# Patient Record
Sex: MALE | Race: WHITE | NOT HISPANIC OR LATINO | Employment: OTHER | URBAN - METROPOLITAN AREA
[De-identification: names, ages, dates, MRNs, and addresses within clinical notes are randomized per-mention and may not be internally consistent; named-entity substitution may affect disease eponyms.]

---

## 2017-01-19 ENCOUNTER — HOSPITAL ENCOUNTER (OUTPATIENT)
Dept: CARDIOLOGY | Facility: HOSPITAL | Age: 57
Discharge: HOME | End: 2017-01-19
Admitting: PHYSICIAN ASSISTANT

## 2017-01-19 ENCOUNTER — OFFICE VISIT (OUTPATIENT)
Dept: CARDIOLOGY | Facility: CLINIC | Age: 57
End: 2017-01-19

## 2017-01-19 VITALS
SYSTOLIC BLOOD PRESSURE: 138 MMHG | DIASTOLIC BLOOD PRESSURE: 88 MMHG | HEIGHT: 73 IN | HEART RATE: 67 BPM | BODY MASS INDEX: 28.36 KG/M2 | WEIGHT: 214 LBS

## 2017-01-19 VITALS
WEIGHT: 220 LBS | SYSTOLIC BLOOD PRESSURE: 149 MMHG | DIASTOLIC BLOOD PRESSURE: 102 MMHG | HEIGHT: 73 IN | BODY MASS INDEX: 29.16 KG/M2

## 2017-01-19 DIAGNOSIS — I42.8 NICM (NONISCHEMIC CARDIOMYOPATHY) (HCC): ICD-10-CM

## 2017-01-19 DIAGNOSIS — R06.09 EXERTIONAL DYSPNEA: ICD-10-CM

## 2017-01-19 DIAGNOSIS — I42.8 NONISCHEMIC CARDIOMYOPATHY (HCC): Primary | ICD-10-CM

## 2017-01-19 DIAGNOSIS — R00.2 PALPITATIONS: ICD-10-CM

## 2017-01-19 DIAGNOSIS — I49.3 FREQUENT PVCS: ICD-10-CM

## 2017-01-19 LAB
BH CV ECHO MEAS - AO MAX PG (FULL): 4.1 MMHG
BH CV ECHO MEAS - AO MAX PG: 6.6 MMHG
BH CV ECHO MEAS - AO ROOT AREA (BSA CORRECTED): 1.3
BH CV ECHO MEAS - AO ROOT AREA: 7 CM^2
BH CV ECHO MEAS - AO ROOT DIAM: 3 CM
BH CV ECHO MEAS - AO V2 MAX: 128 CM/SEC
BH CV ECHO MEAS - BSA(HAYCOCK): 2.3 M^2
BH CV ECHO MEAS - BSA: 2.2 M^2
BH CV ECHO MEAS - BZI_BMI: 29 KILOGRAMS/M^2
BH CV ECHO MEAS - BZI_METRIC_HEIGHT: 185.4 CM
BH CV ECHO MEAS - BZI_METRIC_WEIGHT: 99.8 KG
BH CV ECHO MEAS - CONTRAST EF (2CH): 39.5 ML/M^2
BH CV ECHO MEAS - CONTRAST EF 4CH: 32.1 ML/M^2
BH CV ECHO MEAS - EDV(CUBED): 229.8 ML
BH CV ECHO MEAS - EDV(MOD-SP2): 114 ML
BH CV ECHO MEAS - EDV(MOD-SP4): 193 ML
BH CV ECHO MEAS - EDV(TEICH): 188.7 ML
BH CV ECHO MEAS - EF(CUBED): 46.1 %
BH CV ECHO MEAS - EF(MOD-SP2): 39.5 %
BH CV ECHO MEAS - EF(MOD-SP4): 32.1 %
BH CV ECHO MEAS - EF(TEICH): 37.7 %
BH CV ECHO MEAS - ESV(CUBED): 123.9 ML
BH CV ECHO MEAS - ESV(MOD-SP2): 69 ML
BH CV ECHO MEAS - ESV(MOD-SP4): 131 ML
BH CV ECHO MEAS - ESV(TEICH): 117.5 ML
BH CV ECHO MEAS - FS: 18.6 %
BH CV ECHO MEAS - IVS/LVPW: 1
BH CV ECHO MEAS - IVSD: 1.1 CM
BH CV ECHO MEAS - LA DIMENSION: 4.3 CM
BH CV ECHO MEAS - LA/AO: 1.4
BH CV ECHO MEAS - LAT PEAK E' VEL: 10.1 CM/SEC
BH CV ECHO MEAS - LV DIASTOLIC VOL/BSA (35-75): 86.1 ML/M^2
BH CV ECHO MEAS - LV MASS(C)D: 297.3 GRAMS
BH CV ECHO MEAS - LV MASS(C)DI: 132.7 GRAMS/M^2
BH CV ECHO MEAS - LV MAX PG: 2.5 MMHG
BH CV ECHO MEAS - LV MEAN PG: 1.5 MMHG
BH CV ECHO MEAS - LV SYSTOLIC VOL/BSA (12-30): 58.5 ML/M^2
BH CV ECHO MEAS - LV V1 MAX: 78.4 CM/SEC
BH CV ECHO MEAS - LV V1 MEAN: 58 CM/SEC
BH CV ECHO MEAS - LV V1 VTI: 17.4 CM
BH CV ECHO MEAS - LVIDD: 6.1 CM
BH CV ECHO MEAS - LVIDS: 5 CM
BH CV ECHO MEAS - LVLD AP2: 8.6 CM
BH CV ECHO MEAS - LVLD AP4: 9.7 CM
BH CV ECHO MEAS - LVLS AP2: 7.5 CM
BH CV ECHO MEAS - LVLS AP4: 8 CM
BH CV ECHO MEAS - LVPWD: 1.1 CM
BH CV ECHO MEAS - MED PEAK E' VEL: 6.4 CM/SEC
BH CV ECHO MEAS - MV A MAX VEL: 57.4 CM/SEC
BH CV ECHO MEAS - MV E MAX VEL: 61.7 CM/SEC
BH CV ECHO MEAS - MV E/A: 1.1
BH CV ECHO MEAS - PA ACC SLOPE: 290.9 CM/SEC^2
BH CV ECHO MEAS - PA ACC TIME: 0.19 SEC
BH CV ECHO MEAS - PA MAX PG: 4.4 MMHG
BH CV ECHO MEAS - PA PR(ACCEL): -5 MMHG
BH CV ECHO MEAS - PA V2 MAX: 105.3 CM/SEC
BH CV ECHO MEAS - PI END-D VEL: 85.7 CM/SEC
BH CV ECHO MEAS - RVDD: 3 CM
BH CV ECHO MEAS - SI(CUBED): 47.2 ML/M^2
BH CV ECHO MEAS - SI(MOD-SP2): 20.1 ML/M^2
BH CV ECHO MEAS - SI(MOD-SP4): 27.7 ML/M^2
BH CV ECHO MEAS - SI(TEICH): 31.8 ML/M^2
BH CV ECHO MEAS - SV(CUBED): 105.8 ML
BH CV ECHO MEAS - SV(MOD-SP2): 45 ML
BH CV ECHO MEAS - SV(MOD-SP4): 62 ML
BH CV ECHO MEAS - SV(TEICH): 71.2 ML
BH CV ECHO MEAS - TAPSE (>1.6): 2.6 CM2
BH CV XLRA - RV BASE: 2.9 CM
BH CV XLRA - RV LENGTH: 8.2 CM
BH CV XLRA - RV MID: 2.5 CM
BH CV XLRA - TDI S': 13.2 CM/SEC
E/E' RATIO: 7.5
LEFT ATRIUM VOLUME INDEX: 44 ML/M2
LEFT ATRIUM VOLUME: 100 CM3
LV EF 2D ECHO EST: 48 %

## 2017-01-19 PROCEDURE — C8929 TTE W OR WO FOL WCON,DOPPLER: HCPCS

## 2017-01-19 PROCEDURE — 25010000002 SULFUR HEXAFLUORIDE MICROSPH 60.7-25 MG RECONSTITUTED SUSPENSION: Performed by: PHYSICIAN ASSISTANT

## 2017-01-19 PROCEDURE — 99213 OFFICE O/P EST LOW 20 MIN: CPT | Performed by: PHYSICIAN ASSISTANT

## 2017-01-19 PROCEDURE — 93306 TTE W/DOPPLER COMPLETE: CPT | Performed by: INTERNAL MEDICINE

## 2017-01-19 RX ORDER — CARVEDILOL 6.25 MG/1
TABLET ORAL 2 TIMES DAILY
Refills: 1 | COMMUNITY
Start: 2016-12-22 | End: 2017-01-19 | Stop reason: SDUPTHER

## 2017-01-19 RX ORDER — CARVEDILOL 6.25 MG/1
6.25 TABLET ORAL 2 TIMES DAILY
Qty: 60 TABLET | Refills: 6 | Status: SHIPPED | OUTPATIENT
Start: 2017-01-19 | End: 2017-03-19 | Stop reason: SDUPTHER

## 2017-01-19 RX ORDER — RANITIDINE 300 MG/1
300 TABLET ORAL 2 TIMES DAILY
Refills: 0 | COMMUNITY
Start: 2017-01-06 | End: 2020-08-17

## 2017-01-19 RX ORDER — LISINOPRIL 20 MG/1
20 TABLET ORAL DAILY
Refills: 1 | COMMUNITY
Start: 2016-12-22 | End: 2017-01-19 | Stop reason: SDUPTHER

## 2017-01-19 RX ORDER — LISINOPRIL 20 MG/1
20 TABLET ORAL DAILY
Qty: 30 TABLET | Refills: 6 | Status: SHIPPED | OUTPATIENT
Start: 2017-01-19 | End: 2017-03-19 | Stop reason: SDUPTHER

## 2017-01-19 RX ADMIN — SULFUR HEXAFLUORIDE 2 ML: KIT at 11:06

## 2017-01-19 NOTE — MR AVS SNAPSHOT
David Fajardo   1/19/2017 11:15 AM   Office Visit    Dept Phone:  [phone]   Encounter #:  [id number]    Provider:  KVNG Mishra   Department:  [facility] CARDIOLOGY                Your Full Care Plan              Your Updated Medication List          This list is accurate as of: 1/19/17 12:08 PM.  Always use your most recent med list.                carvedilol 6.25 MG tablet   Commonly known as:  COREG       lisinopril 20 MG tablet   Commonly known as:  PRINIVIL,ZESTRIL       raNITIdine 300 MG tablet   Commonly known as:  ZANTAC               You Were Diagnosed With        Codes Comments    Nonischemic cardiomyopathy    -  Primary ICD-10-CM: I42.9  ICD-9-CM: 425.4     Palpitations     ICD-10-CM: R00.2  ICD-9-CM: 785.1     Exertional dyspnea     ICD-10-CM: R06.09  ICD-9-CM: 786.09       Instructions     None    Patient Instructions History      Upcoming Appointments     Visit Type Date Time Department    FOLLOW UP 1/19/2017 11:15 AM [facility]     [facility] ECHO 2D COMPLETE VT 1/19/2017  9:30 AM [facility] NONINVASIVE LAB    FOLLOW UP 3/16/2017 10:30 AM [facility]      MyChart Signup     Our records indicate that you have declined [employer] signup. If you would like to sign up for basnohart, please email We [email or web address] or call [phone] to obtain an activation code.             Other Info from Your Visit           Your Appointments     Mar 16, 2017 10:30 AM EDT   Follow Up with KVNG Mishra   [facility] CARDIOLOGY (--)    [address] [place] [address] [address] [place] [id number] [phone]           Arrive 15 minutes prior to appointment.              Allergies     No Known Allergies      Reason for Visit     Follow-up Cardiomyopathy    Dizziness     Shortness of Breath     Palpitations           Vital Signs     Blood Pressure Pulse Height Weight Body Mass Index Smoking Status    "   138/88 (BP Location: Right arm, Patient Position: Sitting) 67 73\" (185.4 cm) 214 lb (97.1 kg) 28.23 kg/m2 Former Smoker      Problems and Diagnoses Noted     Exertional dyspnea    Nonischemic cardiomyopathy    Palpitations        "

## 2017-01-19 NOTE — PROGRESS NOTES
"     [facility] at [facility] - Office Note  David Fajardo         [address] [address]  1960   [phone] (home)      LOCATION:  [place] office.  Visit Type: Follow Up.    PCP:  Viki Mon MD    01/19/17   David Fajardo is a 56 y.o.  male  retired.      Chief Complaint:  Follow-up, c/o Dizziness, Shortness of Breath and Palpitations.  PROBLEM LIST:  1. Nonischemic cardiomyopathy:  a. Abnormal Holter monitor with associated dizziness and nausea.   b. PVCs and nonsustained VT by Holter monitor.  c. Cardiac catheterization, 10/22/2013: Normal coronaries but EF approximately 15% to 20%.  d. Echocardiographic ejection fraction, January 2014, 45% to 50%.  e. Echocardiogram, 05/04/2015, EF of 45%.  f. Echo 1/19/17:  EF 48%, mild MR/SD.  2. Family history of premature cardiomyopathy.  3. History of bleeding ulcers with resolution.  4. Tobacco abuse, quitting 32 years ago.  5. Surgical history:  a. Right tear duct opening in 1998.      No Known Allergies      Current Outpatient Prescriptions:   •  carvedilol (COREG) 6.25 MG tablet, 2 (Two) Times a Day., Disp: , Rfl: 1  •  lisinopril (PRINIVIL,ZESTRIL) 20 MG tablet, Take 20 mg by mouth Daily., Disp: , Rfl: 1  •  raNITIdine (ZANTAC) 300 MG tablet, Take 300 mg by mouth Daily., Disp: , Rfl: 0  No current facility-administered medications for this visit.     HPI    Mr. Pimentel is here for routine follow-up on his nonischemic cardiopathy myopathy.  Since her last visit with us, he's done fairly well.  Lately, he's complained of generalized fatigue, dyspnea, occasional fluttering of his heart.  He also mentions abdominal bloating that had a normal EGD and GI workup.  He is \"on call\" several times throughout a weeks period, which requires him to get up in the middle the night for 6-8 hours on a job site.  He believes a lot of his symptoms are resulting from his fatigue and lifestyle.  He denies chest pain, denies lower " "extremity edema, denies syncopal events.  The following portions of the patient's history were reviewed in the chart and updated as appropriate: allergies, current medications, past family history, past medical history, past social history, past surgical history and problem list.    Review of Systems   Constitution: Positive for decreased appetite and malaise/fatigue.   Cardiovascular: Positive for dyspnea on exertion and palpitations.   Gastrointestinal: Positive for bloating.   All other systems reviewed and are negative.            height is 73\" (185.4 cm) and weight is 214 lb (97.1 kg). His blood pressure is 138/88 and his pulse is 67.   Physical Exam   Constitutional: Vital signs are normal. He appears well-developed and well-nourished.   Cardiovascular: Normal rate, regular rhythm, S1 normal, S2 normal, normal heart sounds, intact distal pulses and normal pulses.    Pulmonary/Chest: Effort normal and breath sounds normal. He has no wheezes. He has no rhonchi. He has no rales.   Abdominal: Soft. Normal appearance and bowel sounds are normal. There is no hepatosplenomegaly.   Neurological: He is alert.         Procedures     Assessment/ Plan     Nonischemic cardiomyopathy:  Stable and well compensated.  Continue medications, provide refills.  I reviewed today's echo with the patient and family.  RTC in march.  If he is feeling better at that time with rest and no work - he can reschedule out to June.    Palpitations:  Stable.    Exertional dyspnea:  Stable.  Most likely attributable to fatigue.  Continue medications.  Echo wnl/stable.  RTC as directed.    Fatimah Calero PA-C    "

## 2017-02-09 ENCOUNTER — TELEPHONE (OUTPATIENT)
Dept: CARDIOLOGY | Facility: CLINIC | Age: 57
End: 2017-02-09

## 2017-03-20 RX ORDER — CARVEDILOL 6.25 MG/1
TABLET ORAL
Qty: 60 TABLET | Refills: 11 | Status: SHIPPED | OUTPATIENT
Start: 2017-03-20 | End: 2018-08-09 | Stop reason: SDUPTHER

## 2017-03-20 RX ORDER — LISINOPRIL 20 MG/1
TABLET ORAL
Qty: 30 TABLET | Refills: 11 | Status: SHIPPED | OUTPATIENT
Start: 2017-03-20 | End: 2018-08-09 | Stop reason: SDUPTHER

## 2017-04-20 ENCOUNTER — OFFICE VISIT (OUTPATIENT)
Dept: CARDIOLOGY | Facility: CLINIC | Age: 57
End: 2017-04-20

## 2017-04-20 VITALS
BODY MASS INDEX: 27.43 KG/M2 | SYSTOLIC BLOOD PRESSURE: 124 MMHG | DIASTOLIC BLOOD PRESSURE: 80 MMHG | HEART RATE: 56 BPM | WEIGHT: 207 LBS | HEIGHT: 73 IN

## 2017-04-20 DIAGNOSIS — R00.2 PALPITATIONS: ICD-10-CM

## 2017-04-20 DIAGNOSIS — R06.09 EXERTIONAL DYSPNEA: ICD-10-CM

## 2017-04-20 DIAGNOSIS — I42.8 NONISCHEMIC CARDIOMYOPATHY (HCC): Primary | ICD-10-CM

## 2017-04-20 PROCEDURE — 99213 OFFICE O/P EST LOW 20 MIN: CPT | Performed by: PHYSICIAN ASSISTANT

## 2017-04-20 RX ORDER — SERTRALINE HYDROCHLORIDE 25 MG/1
25 TABLET, FILM COATED ORAL DAILY
COMMUNITY
End: 2018-08-09

## 2017-04-20 NOTE — PROGRESS NOTES
[facility] at [facility] - Office Note  David Fajardo         [address] [address]  1960   [phone] (home)      LOCATION:  [place] office.  Visit Type: Follow Up.    PCP:  Viki Mon MD    04/20/17   David Fajardo is a 57 y.o.  male  currently employed.      Chief Complaint: Fatigue  PROBLEM LIST:  1. Nonischemic cardiomyopathy:  a. Abnormal Holter monitor with associated dizziness and nausea.   b. PVCs and nonsustained VT by Holter monitor.  c. Cardiac catheterization, 10/22/2013: Normal coronaries but EF approximately 15% to 20%.  d. Echocardiographic ejection fraction, January 2014, 45% to 50%.  e. Echocardiogram, 05/04/2015, EF of 45%.  f. Echo 1/19/17: EF 48%, mild MR/DE.  2. Family history of premature cardiomyopathy.  3. History of bleeding ulcers with resolution.  4. Tobacco abuse, quitting 32 years ago.  5. Surgical history:  a. Right tear duct opening in 1998.         No Known Allergies      Current Outpatient Prescriptions:   •  carvedilol (COREG) 6.25 MG tablet, take 1 tablet by mouth twice a day, Disp: 60 tablet, Rfl: 11  •  lisinopril (PRINIVIL,ZESTRIL) 20 MG tablet, take 1 tablet by mouth once daily, Disp: 30 tablet, Rfl: 11  •  raNITIdine (ZANTAC) 300 MG tablet, Take 300 mg by mouth Daily., Disp: , Rfl: 0  •  sertraline (ZOLOFT) 25 MG tablet, Take 25 mg by mouth Daily., Disp: , Rfl:     HPI    Mr. Fajardo is here for follow-up on his nonischemic cardiomyopathy.  At his last visit, he was complaining of 2 specific complaints; abdominal bloating and fatigue with dyspnea.  Since that visit, he is further along in the process of retiring and is no longer on call at night.  He believes this is helped with his fatigue and dyspnea.  He does still wake up concerned about all of these matters, and has subsequently been put on an antidepressant by his PCP.  As far as his abdominal bloating is concerned, his Zantac has been changed from a capsule to  "a tablet.  He states that for whatever reason, this is helped tremendously.  He has no further GI issues today.  He did file for disability after his last appointment.  He was denied and is currently going through the peel process.  The following portions of the patient's history were reviewed in the chart and updated as appropriate: allergies, current medications, past family history, past medical history, past social history, past surgical history and problem list.    Review of Systems   Constitution: Positive for weakness. Negative for weight gain.   Cardiovascular: Negative for chest pain and leg swelling.   Respiratory: Negative for shortness of breath.    Gastrointestinal: Negative for bloating and change in bowel habit.   Psychiatric/Behavioral: The patient is nervous/anxious.    All other systems reviewed and are negative.            height is 73\" (185.4 cm) and weight is 207 lb (93.9 kg). His blood pressure is 124/80 and his pulse is 56.   Physical Exam   Constitutional: Vital signs are normal. He appears well-developed and well-nourished.   Cardiovascular: Normal rate, regular rhythm, S1 normal, S2 normal, normal heart sounds, intact distal pulses and normal pulses.    Pulmonary/Chest: Effort normal and breath sounds normal. He has no wheezes. He has no rhonchi. He has no rales.   Abdominal: Soft. Normal appearance and bowel sounds are normal. There is no hepatosplenomegaly.   Neurological: He is alert.         Procedures     Assessment/ Plan   Nonischemic cardiomyopathy:  Stable and well compensated.  Again, I wanted to follow-up with him sooner rather than later to make sure that his symptoms were job-related.  At this time he appears to be doing better and has no further complaints.  Continue current medical regimen which includes beta blocker, ACE inhibitor.  Return to clinic 6 months or sooner when necessary.      Palpitations:  No further recurrence.  Continue on beta blocker.      Exertional " dyspnea:  Stable without progression.  Continue activity as tolerated particularly with [facility].        Fatimah Calero PA-C  4/20/2017 9:11 AM      EMR Dragon/Transcription disclaimer:   Much of this encounter note is an electronic transcription/translation of spoken language to printed text. The electronic translation of spoken language may permit erroneous, or at times, nonsensical words or phrases to be inadvertently transcribed; Although I have reviewed the note for such errors, some may still exist.

## 2017-04-24 ENCOUNTER — LAB REQUISITION (OUTPATIENT)
Dept: LAB | Facility: HOSPITAL | Age: 57
End: 2017-04-24

## 2017-04-24 DIAGNOSIS — A04.8 OTHER SPECIFIED BACTERIAL INTESTINAL INFECTIONS: ICD-10-CM

## 2017-04-24 PROCEDURE — 87338 HPYLORI STOOL AG IA: CPT | Performed by: INTERNAL MEDICINE

## 2017-04-27 LAB — H PYLORI AG STL QL IA: NEGATIVE

## 2017-10-30 ENCOUNTER — OFFICE VISIT (OUTPATIENT)
Dept: CARDIOLOGY | Facility: CLINIC | Age: 57
End: 2017-10-30

## 2017-10-30 VITALS
BODY MASS INDEX: 27.7 KG/M2 | OXYGEN SATURATION: 99 % | SYSTOLIC BLOOD PRESSURE: 122 MMHG | HEART RATE: 64 BPM | HEIGHT: 73 IN | DIASTOLIC BLOOD PRESSURE: 84 MMHG | WEIGHT: 209 LBS

## 2017-10-30 DIAGNOSIS — I42.8 NONISCHEMIC CARDIOMYOPATHY (HCC): Primary | ICD-10-CM

## 2017-10-30 DIAGNOSIS — I49.3 PVC'S (PREMATURE VENTRICULAR CONTRACTIONS): ICD-10-CM

## 2017-10-30 PROCEDURE — 99214 OFFICE O/P EST MOD 30 MIN: CPT | Performed by: INTERNAL MEDICINE

## 2017-10-30 PROCEDURE — 93000 ELECTROCARDIOGRAM COMPLETE: CPT | Performed by: INTERNAL MEDICINE

## 2017-10-30 RX ORDER — ATORVASTATIN CALCIUM 10 MG/1
10 TABLET, FILM COATED ORAL DAILY
COMMUNITY
End: 2018-08-09 | Stop reason: SDUPTHER

## 2017-10-30 NOTE — PROGRESS NOTES
[facility] at [facility]  Office Progress Note  David Fajardo  1960  [phone]      Visit Date: 10/30/2017    PCP: Viki Mon MD  [address] [place] [address]    IDENTIFICATION: A 57 y.o. male, , retired, from [place], [place], daughter is [employer] pt Jenise Burgos     Chief Complaint   Patient presents with   • NICM       PROBLEM LIST:   1. Nonischemic cardiomyopathy:  a. Abnormal Holter monitor with associated dizziness and nausea.   b. PVCs and nonsustained VT by Holter monitor.  c. Cardiac catheterization, 10/22/2013: Normal coronaries but EF approximately 15% to 20%.  d. Echocardiographic ejection fraction, January 2014, 45% to 50%.  e. Echocardiogram, 05/04/2015, EF of 45%.  f. Echo 1/19/17: EF 48%, mild MR/DC.  2. Family history of premature cardiomyopathy.  3. History of bleeding ulcers with resolution.  4. Tobacco abuse, quitting 32 years ago.  5. Surgical history:  a. Right tear duct opening in 1998.        Allergies  No Known Allergies    Current Medications    Current Outpatient Prescriptions:   •  atorvastatin (LIPITOR) 10 MG tablet, Take 10 mg by mouth Daily., Disp: , Rfl:   •  carvedilol (COREG) 6.25 MG tablet, take 1 tablet by mouth twice a day, Disp: 60 tablet, Rfl: 11  •  lisinopril (PRINIVIL,ZESTRIL) 20 MG tablet, take 1 tablet by mouth once daily, Disp: 30 tablet, Rfl: 11  •  raNITIdine (ZANTAC) 300 MG tablet, Take 300 mg by mouth 2 (Two) Times a Day., Disp: , Rfl: 0  •  sertraline (ZOLOFT) 25 MG tablet, Take 25 mg by mouth Daily., Disp: , Rfl:       History of Present Illness   Pt here for follow up. Had previously been seeing Dr. Newell. Stable dyspnea. Is now retired and enjoying doing hobbies around the house. Pt denies any chest pain, dyspnea at rest, orthopnea, PND, palpitations, lower extremity edema, or claudication.    ROS:  All systems have been reviewed and are negative with the exception of those mentioned in the  "HPI.    OBJECTIVE:  Vitals:    10/30/17 1033   BP: 122/84   BP Location: Right arm   Patient Position: Sitting   Pulse: 64   SpO2: 99%   Weight: 209 lb (94.8 kg)   Height: 73\" (185.4 cm)     Physical Exam   Constitutional: He is oriented to person, place, and time. He appears well-developed and well-nourished. No distress.   Cardiovascular: Normal rate, regular rhythm, normal heart sounds and intact distal pulses.    No murmur heard.  Pulses:       Radial pulses are 2+ on the right side, and 2+ on the left side.        Dorsalis pedis pulses are 2+ on the right side, and 2+ on the left side.        Posterior tibial pulses are 2+ on the right side, and 2+ on the left side.   Pulmonary/Chest: Effort normal and breath sounds normal. He has no wheezes. He has no rales.   Abdominal: Soft. Bowel sounds are normal. There is no tenderness. There is no guarding.   Musculoskeletal: He exhibits no edema or tenderness.   Neurological: He is alert and oriented to person, place, and time.   Skin: Skin is warm and dry. No rash noted.   Psychiatric: He has a normal mood and affect.   Nursing note and vitals reviewed.      Diagnostic Data:    ECG 12 Lead  Date/Time: 10/30/2017 10:43 AM  Performed by: SCOTT MEJIA  Authorized by: SCOTT MEJIA   Rhythm: sinus rhythm  Ectopy: infrequent PVCs  BPM: 60  Clinical impression: abnormal ECG          ASSESSMENT:   Diagnosis Plan   1. Nonischemic cardiomyopathy     2. PVC's (premature ventricular contractions)  ECG 12 Lead       PLAN:  1. Stable symptoms with echo in January showing EF 48%. Will plan on echo every 1-2 years. Pt on lisinopril and coreg at present, will hold off on entresto/spironolactone at this time due to BP being optimal and doing well clinically. Would consider in the future with changes of symptoms.   2. Asymptomatic    Viki Mon MD, thank you for referring Mr. Fajardo for evaluation.  I have forwarded my electronically generated recommendations to you for " review.  Please do not hesitate to call with any questions.    Scribed for Ki Forbes MD by Ene Martinez PA-C. 10/30/2017  11:01 AM  I, Ki Forbes MD, personally performed the services described in this documentation as scribed by the above named individual in my presence, and it is both accurate and complete.  10/30/2017  12:40 PM    Ki Forbes MD, FACC

## 2018-04-13 ENCOUNTER — TELEPHONE (OUTPATIENT)
Dept: CARDIOLOGY | Facility: CLINIC | Age: 58
End: 2018-04-13

## 2018-04-13 NOTE — TELEPHONE ENCOUNTER
Called and let daughter know patient handicap paperwork is ready for pickup. Will have in [place] on Monday.

## 2018-08-09 ENCOUNTER — OFFICE VISIT (OUTPATIENT)
Dept: CARDIOLOGY | Facility: CLINIC | Age: 58
End: 2018-08-09

## 2018-08-09 VITALS
HEIGHT: 73 IN | DIASTOLIC BLOOD PRESSURE: 76 MMHG | WEIGHT: 210.6 LBS | BODY MASS INDEX: 27.91 KG/M2 | SYSTOLIC BLOOD PRESSURE: 132 MMHG | HEART RATE: 54 BPM

## 2018-08-09 DIAGNOSIS — I50.22 CHRONIC SYSTOLIC CONGESTIVE HEART FAILURE (HCC): ICD-10-CM

## 2018-08-09 DIAGNOSIS — R00.2 PALPITATIONS: Primary | ICD-10-CM

## 2018-08-09 PROCEDURE — 99213 OFFICE O/P EST LOW 20 MIN: CPT | Performed by: INTERNAL MEDICINE

## 2018-08-09 RX ORDER — LISINOPRIL 20 MG/1
20 TABLET ORAL DAILY
Qty: 30 TABLET | Refills: 11 | Status: SHIPPED | OUTPATIENT
Start: 2018-08-09

## 2018-08-09 RX ORDER — ATORVASTATIN CALCIUM 10 MG/1
10 TABLET, FILM COATED ORAL DAILY
Qty: 30 TABLET | Refills: 11 | Status: SHIPPED | OUTPATIENT
Start: 2018-08-09 | End: 2019-08-12 | Stop reason: SDUPTHER

## 2018-08-09 RX ORDER — CARVEDILOL 6.25 MG/1
6.25 TABLET ORAL 2 TIMES DAILY
Qty: 60 TABLET | Refills: 11 | Status: SHIPPED | OUTPATIENT
Start: 2018-08-09

## 2018-08-09 NOTE — PROGRESS NOTES
[facility] at [facility]  Office Progress Note  David Fajardo  1960  [phone]      Visit Date: 8/9/2018      PCP: Viki Mon MD  [address] [place] [address]    IDENTIFICATION: A 58 y.o. male, , retired, from [place], [place], daughter is [employer] pt Jenise Burgos     Chief Complaint   Patient presents with   • Nonischemic cardiomyopathy       PROBLEM LIST:   1. Nonischemic cardiomyopathy:  a. Abnormal Holter monitor with associated dizziness and nausea.   b. PVCs and nonsustained VT by Holter monitor.  c. Cardiac catheterization, 10/22/2013: Normal coronaries but EF approximately 15% to 20%.  d. Echocardiographic ejection fraction, January 2014, 45% to 50%.  e. Echocardiogram, 05/04/2015, EF of 45%.  f. Echo 1/19/17: EF 48%, mild MR/MD.  2. Family history of premature cardiomyopathy.  3. History of bleeding ulcers with resolution.  4. Tobacco abuse, quitting 32 years ago.  5. Surgical history:  a. Right tear duct opening in 1998.        Allergies  No Known Allergies    Current Medications    Current Outpatient Prescriptions:   •  atorvastatin (LIPITOR) 10 MG tablet, Take 10 mg by mouth Daily., Disp: , Rfl:   •  carvedilol (COREG) 6.25 MG tablet, take 1 tablet by mouth twice a day, Disp: 60 tablet, Rfl: 11  •  lisinopril (PRINIVIL,ZESTRIL) 20 MG tablet, take 1 tablet by mouth once daily, Disp: 30 tablet, Rfl: 11  •  raNITIdine (ZANTAC) 300 MG tablet, Take 300 mg by mouth 2 (Two) Times a Day., Disp: , Rfl: 0      History of Present Illness   Pt here for follow up. Had remotely followed w  Dr. Newell. Stable dyspnea. Pt denies any chest pain, dyspnea at rest, orthopnea, PND, palpitations, lower extremity edema, or claudication.  Takes cruises regularly and works out of doors.    ROS:  All systems have been reviewed and are negative with the exception of those mentioned in the HPI.    OBJECTIVE:  Vitals:    08/09/18 0856   BP: 132/76   BP Location: Right arm  "  Patient Position: Sitting   Pulse: 54   Weight: 95.5 kg (210 lb 9.6 oz)   Height: 185.4 cm (73\")     Physical Exam   Constitutional: He is oriented to person, place, and time. He appears well-developed and well-nourished. No distress.   Cardiovascular: Normal rate, regular rhythm, normal heart sounds and intact distal pulses.    No murmur heard.  Pulses:       Radial pulses are 2+ on the right side, and 2+ on the left side.        Dorsalis pedis pulses are 2+ on the right side, and 2+ on the left side.        Posterior tibial pulses are 2+ on the right side, and 2+ on the left side.   Pulmonary/Chest: Effort normal and breath sounds normal. He has no wheezes. He has no rales.   Abdominal: Soft. Bowel sounds are normal. There is no tenderness. There is no guarding.   Musculoskeletal: He exhibits no edema or tenderness.   Neurological: He is alert and oriented to person, place, and time.   Skin: Skin is warm and dry. No rash noted.   Psychiatric: He has a normal mood and affect.   Nursing note and vitals reviewed.      Diagnostic Data:  Procedures  ASSESSMENT:   Diagnosis Plan   1. Palpitations     2. Chronic systolic congestive heart failure (CMS/HCC)         PLAN:   1.  Systolic chf - to fu echo this next year.   2.  Palpitations stable w control   3.   Htn controlled.     8/9/2018  9:03 AM    Ki Forbes MD, FACC  "

## 2019-01-11 DIAGNOSIS — I42.9 CARDIOMYOPATHY, UNSPECIFIED TYPE (HCC): Primary | ICD-10-CM

## 2019-07-03 ENCOUNTER — TELEPHONE (OUTPATIENT)
Dept: CARDIOLOGY | Facility: CLINIC | Age: 59
End: 2019-07-03

## 2019-07-03 ENCOUNTER — LAB (OUTPATIENT)
Dept: LAB | Facility: HOSPITAL | Age: 59
End: 2019-07-03

## 2019-07-03 ENCOUNTER — TRANSCRIBE ORDERS (OUTPATIENT)
Dept: LAB | Facility: HOSPITAL | Age: 59
End: 2019-07-03

## 2019-07-03 DIAGNOSIS — A77.40 EHRLICHIOSIS, UNSPECIFIED: ICD-10-CM

## 2019-07-03 DIAGNOSIS — N17.0 ACUTE KIDNEY FAILURE WITH LESION OF TUBULAR NECROSIS (HCC): ICD-10-CM

## 2019-07-03 DIAGNOSIS — R91.8 LUNG MASS: ICD-10-CM

## 2019-07-03 DIAGNOSIS — R65.21 SEPTIC SHOCK DUE TO TRANSFUSION, INITIAL ENCOUNTER (HCC): ICD-10-CM

## 2019-07-03 DIAGNOSIS — A77.40 EHRLICHIOSIS, UNSPECIFIED: Primary | ICD-10-CM

## 2019-07-03 DIAGNOSIS — R74.02 NONSPECIFIC ELEVATION OF LEVELS OF TRANSAMINASE OR LACTIC ACID DEHYDROGENASE (LDH): ICD-10-CM

## 2019-07-03 DIAGNOSIS — A41.9 SEPTIC SHOCK DUE TO TRANSFUSION, INITIAL ENCOUNTER (HCC): ICD-10-CM

## 2019-07-03 DIAGNOSIS — J98.11 DISCOID ATELECTASIS: ICD-10-CM

## 2019-07-03 DIAGNOSIS — T80.29XA SEPTIC SHOCK DUE TO TRANSFUSION, INITIAL ENCOUNTER (HCC): ICD-10-CM

## 2019-07-03 DIAGNOSIS — D69.59 THROMBOCYTOPENIA DUE TO DIMINISHED PLATELET PRODUCTION: ICD-10-CM

## 2019-07-03 DIAGNOSIS — R74.01 NONSPECIFIC ELEVATION OF LEVELS OF TRANSAMINASE OR LACTIC ACID DEHYDROGENASE (LDH): ICD-10-CM

## 2019-07-03 LAB
ALBUMIN SERPL-MCNC: 3.6 G/DL (ref 3.5–5.2)
ALBUMIN/GLOB SERPL: 1.3 G/DL
ALP SERPL-CCNC: 88 U/L (ref 39–117)
ALT SERPL W P-5'-P-CCNC: 110 U/L (ref 1–41)
ANION GAP SERPL CALCULATED.3IONS-SCNC: 11 MMOL/L (ref 5–15)
AST SERPL-CCNC: 61 U/L (ref 1–40)
BASOPHILS # BLD MANUAL: 0.08 10*3/MM3 (ref 0–0.2)
BASOPHILS NFR BLD AUTO: 1 % (ref 0–1.5)
BILIRUB SERPL-MCNC: 0.8 MG/DL (ref 0.2–1.2)
BUN BLD-MCNC: 16 MG/DL (ref 8–23)
BUN/CREAT SERPL: 16.2 (ref 7–25)
CALCIUM SPEC-SCNC: 9.1 MG/DL (ref 8.6–10.5)
CHLORIDE SERPL-SCNC: 102 MMOL/L (ref 98–107)
CO2 SERPL-SCNC: 27 MMOL/L (ref 22–29)
CREAT BLD-MCNC: 0.99 MG/DL (ref 0.76–1.27)
CRP SERPL-MCNC: 1.15 MG/DL (ref 0–0.5)
DEPRECATED RDW RBC AUTO: 44.7 FL (ref 37–54)
EOSINOPHIL # BLD MANUAL: 0.16 10*3/MM3 (ref 0–0.4)
EOSINOPHIL NFR BLD MANUAL: 2 % (ref 0.3–6.2)
ERYTHROCYTE [DISTWIDTH] IN BLOOD BY AUTOMATED COUNT: 13.8 % (ref 12.3–15.4)
ERYTHROCYTE [SEDIMENTATION RATE] IN BLOOD: 9 MM/HR (ref 0–20)
GFR SERPL CREATININE-BSD FRML MDRD: 77 ML/MIN/1.73
GLOBULIN UR ELPH-MCNC: 2.8 GM/DL
GLUCOSE BLD-MCNC: 90 MG/DL (ref 65–99)
HCT VFR BLD AUTO: 41.7 % (ref 37.5–51)
HGB BLD-MCNC: 13.3 G/DL (ref 13–17.7)
LYMPHOCYTES # BLD MANUAL: 2.12 10*3/MM3 (ref 0.7–3.1)
LYMPHOCYTES NFR BLD MANUAL: 14 % (ref 5–12)
LYMPHOCYTES NFR BLD MANUAL: 27 % (ref 19.6–45.3)
MCH RBC QN AUTO: 28.3 PG (ref 26.6–33)
MCHC RBC AUTO-ENTMCNC: 31.9 G/DL (ref 31.5–35.7)
MCV RBC AUTO: 88.7 FL (ref 79–97)
MONOCYTES # BLD AUTO: 1.1 10*3/MM3 (ref 0.1–0.9)
NEUTROPHILS # BLD AUTO: 2.75 10*3/MM3 (ref 1.7–7)
NEUTROPHILS NFR BLD MANUAL: 35 % (ref 42.7–76)
PLAT MORPH BLD: NORMAL
PLATELET # BLD AUTO: 356 10*3/MM3 (ref 140–450)
PMV BLD AUTO: 9.7 FL (ref 6–12)
POTASSIUM BLD-SCNC: 5 MMOL/L (ref 3.5–5.2)
PROT SERPL-MCNC: 6.4 G/DL (ref 6–8.5)
RBC # BLD AUTO: 4.7 10*6/MM3 (ref 4.14–5.8)
RBC MORPH BLD: NORMAL
SODIUM BLD-SCNC: 140 MMOL/L (ref 136–145)
VARIANT LYMPHS NFR BLD MANUAL: 21 % (ref 0–5)
WBC MORPH BLD: NORMAL
WBC NRBC COR # BLD: 7.86 10*3/MM3 (ref 3.4–10.8)

## 2019-07-03 PROCEDURE — 85007 BL SMEAR W/DIFF WBC COUNT: CPT

## 2019-07-03 PROCEDURE — 85652 RBC SED RATE AUTOMATED: CPT

## 2019-07-03 PROCEDURE — 36415 COLL VENOUS BLD VENIPUNCTURE: CPT

## 2019-07-03 PROCEDURE — 86140 C-REACTIVE PROTEIN: CPT

## 2019-07-03 PROCEDURE — 80053 COMPREHEN METABOLIC PANEL: CPT

## 2019-07-03 PROCEDURE — 85025 COMPLETE CBC W/AUTO DIFF WBC: CPT

## 2019-07-08 ENCOUNTER — RESULTS ENCOUNTER (OUTPATIENT)
Dept: LAB | Facility: HOSPITAL | Age: 59
End: 2019-07-08

## 2019-07-08 DIAGNOSIS — N17.0 ACUTE KIDNEY FAILURE WITH LESION OF TUBULAR NECROSIS (HCC): ICD-10-CM

## 2019-07-08 DIAGNOSIS — R74.01 NONSPECIFIC ELEVATION OF LEVELS OF TRANSAMINASE OR LACTIC ACID DEHYDROGENASE (LDH): ICD-10-CM

## 2019-07-08 DIAGNOSIS — T80.29XA SEPTIC SHOCK DUE TO TRANSFUSION, INITIAL ENCOUNTER (HCC): ICD-10-CM

## 2019-07-08 DIAGNOSIS — J98.11 DISCOID ATELECTASIS: ICD-10-CM

## 2019-07-08 DIAGNOSIS — R91.8 LUNG MASS: ICD-10-CM

## 2019-07-08 DIAGNOSIS — D69.59 THROMBOCYTOPENIA DUE TO DIMINISHED PLATELET PRODUCTION: ICD-10-CM

## 2019-07-08 DIAGNOSIS — A41.9 SEPTIC SHOCK DUE TO TRANSFUSION, INITIAL ENCOUNTER (HCC): ICD-10-CM

## 2019-07-08 DIAGNOSIS — R74.02 NONSPECIFIC ELEVATION OF LEVELS OF TRANSAMINASE OR LACTIC ACID DEHYDROGENASE (LDH): ICD-10-CM

## 2019-07-08 DIAGNOSIS — A77.40 EHRLICHIOSIS, UNSPECIFIED: ICD-10-CM

## 2019-07-08 DIAGNOSIS — R65.21 SEPTIC SHOCK DUE TO TRANSFUSION, INITIAL ENCOUNTER (HCC): ICD-10-CM

## 2019-07-15 DIAGNOSIS — I42.8 NONISCHEMIC CARDIOMYOPATHY (HCC): ICD-10-CM

## 2019-07-15 DIAGNOSIS — N17.9 ACUTE RENAL FAILURE, UNSPECIFIED ACUTE RENAL FAILURE TYPE (HCC): Primary | ICD-10-CM

## 2019-07-18 ENCOUNTER — LAB (OUTPATIENT)
Dept: LAB | Facility: HOSPITAL | Age: 59
End: 2019-07-18

## 2019-07-18 ENCOUNTER — TRANSCRIBE ORDERS (OUTPATIENT)
Dept: LAB | Facility: HOSPITAL | Age: 59
End: 2019-07-18

## 2019-07-18 DIAGNOSIS — R91.8 LUNG MASS: ICD-10-CM

## 2019-07-18 DIAGNOSIS — A41.9 SEPTIC SHOCK DUE TO TRANSFUSION, INITIAL ENCOUNTER (HCC): ICD-10-CM

## 2019-07-18 DIAGNOSIS — R65.21 SEPTIC SHOCK DUE TO TRANSFUSION, INITIAL ENCOUNTER (HCC): ICD-10-CM

## 2019-07-18 DIAGNOSIS — T80.29XA SEPTIC SHOCK DUE TO TRANSFUSION, INITIAL ENCOUNTER (HCC): ICD-10-CM

## 2019-07-18 DIAGNOSIS — J98.11 DISCOID ATELECTASIS: ICD-10-CM

## 2019-07-18 DIAGNOSIS — A77.40 EHRLICHIOSIS, UNSPECIFIED: ICD-10-CM

## 2019-07-18 DIAGNOSIS — A77.40 EHRLICHIOSIS, UNSPECIFIED: Primary | ICD-10-CM

## 2019-07-18 LAB
ALBUMIN SERPL-MCNC: 4.2 G/DL (ref 3.5–5.2)
ALBUMIN/GLOB SERPL: 1.4 G/DL
ALP SERPL-CCNC: 68 U/L (ref 39–117)
ALT SERPL W P-5'-P-CCNC: 50 U/L (ref 1–41)
ANION GAP SERPL CALCULATED.3IONS-SCNC: 12 MMOL/L (ref 5–15)
AST SERPL-CCNC: 29 U/L (ref 1–40)
BILIRUB SERPL-MCNC: 0.7 MG/DL (ref 0.2–1.2)
BUN BLD-MCNC: 16 MG/DL (ref 8–23)
BUN/CREAT SERPL: 16.7 (ref 7–25)
CALCIUM SPEC-SCNC: 9.3 MG/DL (ref 8.6–10.5)
CHLORIDE SERPL-SCNC: 102 MMOL/L (ref 98–107)
CO2 SERPL-SCNC: 27 MMOL/L (ref 22–29)
CREAT BLD-MCNC: 0.96 MG/DL (ref 0.76–1.27)
GFR SERPL CREATININE-BSD FRML MDRD: 80 ML/MIN/1.73
GLOBULIN UR ELPH-MCNC: 2.9 GM/DL
GLUCOSE BLD-MCNC: 89 MG/DL (ref 65–99)
POTASSIUM BLD-SCNC: 4.6 MMOL/L (ref 3.5–5.2)
PROT SERPL-MCNC: 7.1 G/DL (ref 6–8.5)
SODIUM BLD-SCNC: 141 MMOL/L (ref 136–145)

## 2019-07-18 PROCEDURE — 80053 COMPREHEN METABOLIC PANEL: CPT

## 2019-07-18 PROCEDURE — 36415 COLL VENOUS BLD VENIPUNCTURE: CPT

## 2019-08-09 ENCOUNTER — TELEPHONE (OUTPATIENT)
Dept: CARDIOLOGY | Facility: CLINIC | Age: 59
End: 2019-08-09

## 2019-08-12 ENCOUNTER — TELEPHONE (OUTPATIENT)
Dept: CARDIOLOGY | Facility: CLINIC | Age: 59
End: 2019-08-12

## 2019-08-12 ENCOUNTER — HOSPITAL ENCOUNTER (OUTPATIENT)
Dept: CARDIOLOGY | Facility: HOSPITAL | Age: 59
Discharge: HOME | End: 2019-08-12
Attending: INTERNAL MEDICINE

## 2019-08-12 ENCOUNTER — OFFICE VISIT (OUTPATIENT)
Dept: CARDIOLOGY | Facility: CLINIC | Age: 59
End: 2019-08-12

## 2019-08-12 VITALS
HEART RATE: 62 BPM | BODY MASS INDEX: 27.83 KG/M2 | HEIGHT: 73 IN | WEIGHT: 210 LBS | SYSTOLIC BLOOD PRESSURE: 126 MMHG | DIASTOLIC BLOOD PRESSURE: 84 MMHG | OXYGEN SATURATION: 98 %

## 2019-08-12 DIAGNOSIS — E78.2 MIXED HYPERLIPIDEMIA: Primary | ICD-10-CM

## 2019-08-12 DIAGNOSIS — I42.0 DILATED CARDIOMYOPATHY (HCC): Primary | ICD-10-CM

## 2019-08-12 DIAGNOSIS — E78.2 MIXED HYPERLIPIDEMIA: ICD-10-CM

## 2019-08-12 DIAGNOSIS — I10 ESSENTIAL HYPERTENSION: ICD-10-CM

## 2019-08-12 DIAGNOSIS — I42.9 CARDIOMYOPATHY, UNSPECIFIED TYPE (HCC): ICD-10-CM

## 2019-08-12 PROCEDURE — 99213 OFFICE O/P EST LOW 20 MIN: CPT | Performed by: INTERNAL MEDICINE

## 2019-08-12 RX ORDER — CYCLOBENZAPRINE HCL 10 MG
10 TABLET ORAL 3 TIMES DAILY PRN
Refills: 0 | COMMUNITY
Start: 2019-07-29 | End: 2021-12-13

## 2019-08-12 RX ORDER — ONDANSETRON 4 MG/1
4 TABLET, FILM COATED ORAL EVERY 6 HOURS PRN
Refills: 0 | COMMUNITY
Start: 2019-07-18

## 2019-08-12 RX ORDER — ATORVASTATIN CALCIUM 10 MG/1
TABLET, FILM COATED ORAL
Qty: 30 TABLET | Refills: 0 | Status: SHIPPED | OUTPATIENT
Start: 2019-08-12

## 2019-08-12 NOTE — PROGRESS NOTES
[facility] at [facility]  Office Progress Note  David Fajardo  1960  [phone]      Visit Date: 8/12/2019      PCP: Viki Mon MD  [address] [place] [address]    IDENTIFICATION: A 59 y.o. male, , retired, from [place], [place], daughter is [employer] pt Angela Burgos     Chief Complaint   Patient presents with   • PVC's (premature ventricular contractions)   • Nonischemic cardiomyopathy       PROBLEM LIST:   1. Nonischemic cardiomyopathy:  a. Abnormal Holter monitor with associated dizziness and nausea.   b. PVCs and nonsustained VT by Holter monitor.  c. Cardiac catheterization, 10/22/2013: Normal coronaries but EF approximately 15% to 20%.  d. Echocardiographic ejection fraction, January 2014, 45% to 50%.  e. Echocardiogram, 05/04/2015, EF of 45%.  f. Echo 1/19/17: EF 48%, mild MR/LA.  g. 8/12/2019 echo:  2. Family history of premature cardiomyopathy.  3. History of bleeding ulcers with resolution.  4. Tobacco abuse, quitting 32 years ago.  5. Ehrlichiosis 2019 shock w ARF, resolved post ICU SJE  6. Surgical history:  a. Right tear duct opening in 1998.        Allergies  No Known Allergies    Current Medications    Current Outpatient Medications:   •  atorvastatin (LIPITOR) 10 MG tablet, Take 1 tablet by mouth Daily., Disp: 30 tablet, Rfl: 11  •  carvedilol (COREG) 6.25 MG tablet, Take 1 tablet by mouth 2 (Two) Times a Day., Disp: 60 tablet, Rfl: 11  •  cyclobenzaprine (FLEXERIL) 10 MG tablet, Take 10 mg by mouth 3 (Three) Times a Day As Needed., Disp: , Rfl: 0  •  lisinopril (PRINIVIL,ZESTRIL) 20 MG tablet, Take 1 tablet by mouth Daily. (Patient taking differently: Take 20 mg by mouth As Needed.), Disp: 30 tablet, Rfl: 11  •  ondansetron (ZOFRAN) 4 MG tablet, Take 4 mg by mouth Every 6 (Six) Hours As Needed., Disp: , Rfl: 0  •  raNITIdine (ZANTAC) 300 MG tablet, Take 300 mg by mouth 2 (Two) Times a Day., Disp: , Rfl: 0      History of Present Illness   Pt here  "for follow up.  Had a difficult Mimi as he had returned from a cruise to [place] and 2 days in [place] returned home with flulike symptoms myalgias fever anorexia and subsequently was admitted to [facility] and diagnosed with early ketosis.  Patient was in acute renal failure and subsequently treated with doxycycline with improvement in his symptoms.  He has had low normal blood pressure in the interim and has been holding his lisinopril.  He did state that he had an echocardiogram while hospitalized at [facility] and ejection fraction was quoted as 40%.      OBJECTIVE:  Vitals:    08/12/19 0853   BP: 126/84   BP Location: Right arm   Patient Position: Sitting   Pulse: 62   SpO2: 98%   Weight: 95.3 kg (210 lb)   Height: 185.4 cm (73\")     Physical Exam   Constitutional: He is oriented to person, place, and time. He appears well-developed and well-nourished. No distress.   Cardiovascular: Normal rate, regular rhythm, normal heart sounds and intact distal pulses.   No murmur heard.  Pulses:       Radial pulses are 2+ on the right side, and 2+ on the left side.        Dorsalis pedis pulses are 2+ on the right side, and 2+ on the left side.        Posterior tibial pulses are 2+ on the right side, and 2+ on the left side.   Pulmonary/Chest: Effort normal and breath sounds normal. He has no wheezes. He has no rales.   Abdominal: Soft. Bowel sounds are normal. There is no tenderness. There is no guarding.   Musculoskeletal: He exhibits no edema or tenderness.   Neurological: He is alert and oriented to person, place, and time.   Skin: Skin is warm and dry. No rash noted.   Psychiatric: He has a normal mood and affect.   Nursing note and vitals reviewed.      Diagnostic Data:  Procedures  ASSESSMENT:   Diagnosis Plan   1. Dilated cardiomyopathy (CMS/HCC)     2. Essential hypertension     3. Mixed hyperlipidemia         PLAN:  Echo in 60 days to follow chronic CM    Restart nightly lisinopril to avoid " hypotension    Hl on statin      Ki Forbes MD, FACC

## 2019-08-12 NOTE — TELEPHONE ENCOUNTER
Pt seen today and needed refills on his Lipitor but hasn't had a recent lipid panel. I gave him 30 days and put in a lipid panel to have completed.

## 2019-10-11 DIAGNOSIS — I42.8 NONISCHEMIC CARDIOMYOPATHY (HCC): Primary | ICD-10-CM

## 2019-10-11 DIAGNOSIS — R00.2 PALPITATIONS: ICD-10-CM

## 2019-10-14 ENCOUNTER — HOSPITAL ENCOUNTER (OUTPATIENT)
Dept: CARDIOLOGY | Facility: HOSPITAL | Age: 59
Discharge: HOME | End: 2019-10-14
Admitting: INTERNAL MEDICINE

## 2019-10-14 ENCOUNTER — APPOINTMENT (OUTPATIENT)
Dept: CARDIOLOGY | Facility: HOSPITAL | Age: 59
End: 2019-10-14
Attending: INTERNAL MEDICINE

## 2019-10-14 VITALS — BODY MASS INDEX: 27.83 KG/M2 | WEIGHT: 210 LBS | HEIGHT: 73 IN

## 2019-10-14 DIAGNOSIS — I42.8 NONISCHEMIC CARDIOMYOPATHY (HCC): ICD-10-CM

## 2019-10-14 DIAGNOSIS — R00.2 PALPITATIONS: ICD-10-CM

## 2019-10-14 LAB
BH CV ECHO MEAS - AO MAX PG (FULL): 3.1 MMHG
BH CV ECHO MEAS - AO MAX PG: 6 MMHG
BH CV ECHO MEAS - AO MEAN PG (FULL): 3 MMHG
BH CV ECHO MEAS - AO MEAN PG: 4 MMHG
BH CV ECHO MEAS - AO ROOT AREA (BSA CORRECTED): 1.4
BH CV ECHO MEAS - AO ROOT AREA: 7.5 CM^2
BH CV ECHO MEAS - AO ROOT DIAM: 3.1 CM
BH CV ECHO MEAS - AO V2 MAX: 124 CM/SEC
BH CV ECHO MEAS - AO V2 MEAN: 89.3 CM/SEC
BH CV ECHO MEAS - AO V2 VTI: 26.7 CM
BH CV ECHO MEAS - AVA(I,A): 2.6 CM^2
BH CV ECHO MEAS - AVA(I,D): 2.6 CM^2
BH CV ECHO MEAS - AVA(V,A): 2.6 CM^2
BH CV ECHO MEAS - AVA(V,D): 2.6 CM^2
BH CV ECHO MEAS - BSA(HAYCOCK): 2.2 M^2
BH CV ECHO MEAS - BSA: 2.2 M^2
BH CV ECHO MEAS - BZI_BMI: 27.7 KILOGRAMS/M^2
BH CV ECHO MEAS - BZI_METRIC_HEIGHT: 185.4 CM
BH CV ECHO MEAS - BZI_METRIC_WEIGHT: 95.3 KG
BH CV ECHO MEAS - EDV(CUBED): 232.6 ML
BH CV ECHO MEAS - EDV(MOD-SP2): 201 ML
BH CV ECHO MEAS - EDV(MOD-SP4): 246 ML
BH CV ECHO MEAS - EDV(TEICH): 190.4 ML
BH CV ECHO MEAS - EF(CUBED): 26.1 %
BH CV ECHO MEAS - EF(MOD-BP): 30 %
BH CV ECHO MEAS - EF(MOD-SP2): 35.8 %
BH CV ECHO MEAS - EF(MOD-SP4): 34.6 %
BH CV ECHO MEAS - EF(TEICH): 20.6 %
BH CV ECHO MEAS - ESV(CUBED): 171.9 ML
BH CV ECHO MEAS - ESV(MOD-SP2): 129 ML
BH CV ECHO MEAS - ESV(MOD-SP4): 161 ML
BH CV ECHO MEAS - ESV(TEICH): 151.2 ML
BH CV ECHO MEAS - FS: 9.6 %
BH CV ECHO MEAS - IVS/LVPW: 1
BH CV ECHO MEAS - IVSD: 1.1 CM
BH CV ECHO MEAS - LA DIMENSION: 4.6 CM
BH CV ECHO MEAS - LA/AO: 1.5
BH CV ECHO MEAS - LAD MAJOR: 4.8 CM
BH CV ECHO MEAS - LAT PEAK E' VEL: 5.4 CM/SEC
BH CV ECHO MEAS - LATERAL E/E' RATIO: 13.6
BH CV ECHO MEAS - LV DIASTOLIC VOL/BSA (35-75): 112 ML/M^2
BH CV ECHO MEAS - LV MASS(C)D: 303.7 GRAMS
BH CV ECHO MEAS - LV MASS(C)DI: 138.3 GRAMS/M^2
BH CV ECHO MEAS - LV MAX PG: 2.9 MMHG
BH CV ECHO MEAS - LV MEAN PG: 1 MMHG
BH CV ECHO MEAS - LV SYSTOLIC VOL/BSA (12-30): 73.3 ML/M^2
BH CV ECHO MEAS - LV V1 MAX: 85.7 CM/SEC
BH CV ECHO MEAS - LV V1 MEAN: 56.6 CM/SEC
BH CV ECHO MEAS - LV V1 VTI: 18.3 CM
BH CV ECHO MEAS - LVIDD: 6.2 CM
BH CV ECHO MEAS - LVIDS: 5.6 CM
BH CV ECHO MEAS - LVLD AP2: 9.2 CM
BH CV ECHO MEAS - LVLD AP4: 8.7 CM
BH CV ECHO MEAS - LVLS AP2: 7.8 CM
BH CV ECHO MEAS - LVLS AP4: 8 CM
BH CV ECHO MEAS - LVOT AREA (M): 3.8 CM^2
BH CV ECHO MEAS - LVOT AREA: 3.8 CM^2
BH CV ECHO MEAS - LVOT DIAM: 2.2 CM
BH CV ECHO MEAS - LVPWD: 1.1 CM
BH CV ECHO MEAS - MED PEAK E' VEL: 4.6 CM/SEC
BH CV ECHO MEAS - MEDIAL E/E' RATIO: 15.8
BH CV ECHO MEAS - MV A MAX VEL: 54.3 CM/SEC
BH CV ECHO MEAS - MV DEC TIME: 0.22 SEC
BH CV ECHO MEAS - MV E MAX VEL: 73.1 CM/SEC
BH CV ECHO MEAS - MV E/A: 1.3
BH CV ECHO MEAS - PA ACC SLOPE: 762 CM/SEC^2
BH CV ECHO MEAS - PA ACC TIME: 0.11 SEC
BH CV ECHO MEAS - PA MAX PG: 4 MMHG
BH CV ECHO MEAS - PA PR(ACCEL): 31.3 MMHG
BH CV ECHO MEAS - PA V2 MAX: 99.9 CM/SEC
BH CV ECHO MEAS - SI(AO): 91.7 ML/M^2
BH CV ECHO MEAS - SI(CUBED): 27.6 ML/M^2
BH CV ECHO MEAS - SI(LVOT): 31.7 ML/M^2
BH CV ECHO MEAS - SI(MOD-SP2): 32.8 ML/M^2
BH CV ECHO MEAS - SI(MOD-SP4): 38.7 ML/M^2
BH CV ECHO MEAS - SI(TEICH): 17.9 ML/M^2
BH CV ECHO MEAS - SV(AO): 201.5 ML
BH CV ECHO MEAS - SV(CUBED): 60.7 ML
BH CV ECHO MEAS - SV(LVOT): 69.6 ML
BH CV ECHO MEAS - SV(MOD-SP2): 72 ML
BH CV ECHO MEAS - SV(MOD-SP4): 85 ML
BH CV ECHO MEAS - SV(TEICH): 39.3 ML
BH CV ECHO MEAS - TAPSE (>1.6): 2.6 CM2
BH CV ECHO MEASUREMENTS AVERAGE E/E' RATIO: 14.62
BH CV XLRA - RV BASE: 4.3 CM
BH CV XLRA - RV LENGTH: 7.5 CM
BH CV XLRA - RV MID: 3.4 CM
BH CV XLRA - TDI S': 11.8 CM/SEC
LEFT ATRIUM VOLUME INDEX: 31 ML/M^2
LEFT ATRIUM VOLUME: 68 ML
LV EF 2D ECHO EST: 36 %

## 2019-10-14 PROCEDURE — 93306 TTE W/DOPPLER COMPLETE: CPT | Performed by: INTERNAL MEDICINE

## 2019-10-14 PROCEDURE — 93306 TTE W/DOPPLER COMPLETE: CPT

## 2020-02-24 ENCOUNTER — TELEPHONE (OUTPATIENT)
Dept: CARDIOLOGY | Facility: CLINIC | Age: 60
End: 2020-02-24

## 2020-02-24 NOTE — TELEPHONE ENCOUNTER
Patient wife called with concerns from PCP of patient heart rate running at 42 bpm today. Patient is not symptomatic or having any problems. Advised wife to cut morning dose of Coreg in half from 6.25 to 3.125 mg and then take a whole pill at night. Pt wife verbalized understanding and will call back with any issues.

## 2020-08-17 ENCOUNTER — OFFICE VISIT (OUTPATIENT)
Dept: CARDIOLOGY | Facility: CLINIC | Age: 60
End: 2020-08-17

## 2020-08-17 VITALS
HEART RATE: 66 BPM | DIASTOLIC BLOOD PRESSURE: 76 MMHG | HEIGHT: 73 IN | WEIGHT: 218 LBS | OXYGEN SATURATION: 97 % | SYSTOLIC BLOOD PRESSURE: 112 MMHG | BODY MASS INDEX: 28.89 KG/M2

## 2020-08-17 DIAGNOSIS — I10 ESSENTIAL HYPERTENSION: ICD-10-CM

## 2020-08-17 DIAGNOSIS — B33.24 VIRAL CARDIOMYOPATHY (HCC): Primary | ICD-10-CM

## 2020-08-17 DIAGNOSIS — E78.2 MIXED HYPERLIPIDEMIA: ICD-10-CM

## 2020-08-17 PROCEDURE — 93000 ELECTROCARDIOGRAM COMPLETE: CPT | Performed by: INTERNAL MEDICINE

## 2020-08-17 PROCEDURE — 99214 OFFICE O/P EST MOD 30 MIN: CPT | Performed by: INTERNAL MEDICINE

## 2020-08-17 NOTE — PROGRESS NOTES
[facility] at [facility]  Office Progress Note  David Fajardo  1960  [phone]      Visit Date: 8/17/2020      PCP: Viki Mon MD  [address] [place] [address]    IDENTIFICATION: A 60 y.o. male, , retired, from [place], [place], daughter is [facility] pt Angela Burgos     Chief Complaint   Patient presents with   • Cardiomyopathy       PROBLEM LIST:   1. Nonischemic cardiomyopathy:  a. Abnormal Holter monitor with associated dizziness and nausea.   b. PVCs and nonsustained VT by Holter monitor.  c. Cardiac catheterization, 10/22/2013: Normal coronaries but EF approximately 15% to 20%.  d. Echocardiographic ejection fraction, January 2014, 45% to 50%.  e. Echocardiogram, 05/04/2015, EF of 45%.  f. Echo 1/19/17: EF 48%, mild MR/ID.  g. 10/12/2019 echo: EF 36% mod MR  2. Family history of premature cardiomyopathy.  3. History of bleeding ulcers with resolution.  4. Tobacco abuse, quitting 32 years ago.  5. Ehrlichiosis 2019(post [employer] cruise) shock w ARF, resolved post ICU SJE  6. Surgical history:  a. Right tear duct opening in 1998.        Allergies  No Known Allergies    Current Medications    Current Outpatient Medications:   •  atorvastatin (LIPITOR) 10 MG tablet, take 1 tablet by mouth once daily, Disp: 30 tablet, Rfl: 0  •  carvedilol (COREG) 6.25 MG tablet, Take 1 tablet by mouth 2 (Two) Times a Day., Disp: 60 tablet, Rfl: 11  •  cyclobenzaprine (FLEXERIL) 10 MG tablet, Take 10 mg by mouth 3 (Three) Times a Day As Needed., Disp: , Rfl: 0  •  lisinopril (PRINIVIL,ZESTRIL) 20 MG tablet, Take 1 tablet by mouth Daily. (Patient taking differently: Take 20 mg by mouth As Needed.), Disp: 30 tablet, Rfl: 11  •  ondansetron (ZOFRAN) 4 MG tablet, Take 4 mg by mouth Every 6 (Six) Hours As Needed., Disp: , Rfl: 0      History of Present Illness   Pt here for follow up.  He is been compliant with his medication does activities daily living without no crescendo pattern  "of dyspnea.  He attempts to maintain lower carbohydrate intake.    OBJECTIVE:  Vitals:    08/17/20 0927   BP: 112/76   BP Location: Right arm   Patient Position: Sitting   Pulse: 66   SpO2: 97%   Weight: 98.9 kg (218 lb)   Height: 185.4 cm (73\")     Physical Exam   Constitutional: He is oriented to person, place, and time. He appears well-developed and well-nourished. No distress.   Cardiovascular: Normal rate, regular rhythm, normal heart sounds and intact distal pulses.   No murmur heard.  Pulses:       Radial pulses are 2+ on the right side, and 2+ on the left side.        Dorsalis pedis pulses are 2+ on the right side, and 2+ on the left side.        Posterior tibial pulses are 2+ on the right side, and 2+ on the left side.   Pulmonary/Chest: Effort normal and breath sounds normal. He has no wheezes. He has no rales.   Abdominal: Soft. Bowel sounds are normal. There is no tenderness. There is no guarding.   Musculoskeletal: He exhibits no edema or tenderness.   Neurological: He is alert and oriented to person, place, and time.   Skin: Skin is warm and dry. No rash noted.   Psychiatric: He has a normal mood and affect.   Nursing note and vitals reviewed.      Diagnostic Data:    ECG 12 Lead  Date/Time: 8/17/2020 10:41 AM  Performed by: Ki Forbes MD  Authorized by: Ki Forbes MD   Comparison: compared with previous ECG from 10/30/2017  Rhythm: sinus rhythm  Ectopy: unifocal PVCs  BPM: 53    Clinical impression: abnormal EKG          ASSESSMENT:   Diagnosis Plan   1. Viral cardiomyopathy (CMS/HCC)     2. Essential hypertension     3. Mixed hyperlipidemia         PLAN:  Cardiomyopathy-familial New York Heart Association class II CHF at current follow-up echocardiogram this following year    Coreg and lisinopril  dosed to avoid hypotension    Hl on statin      Ki Forbes MD, FACC  "

## 2020-08-18 RX ORDER — FAMOTIDINE 40 MG/1
40 TABLET, FILM COATED ORAL 2 TIMES DAILY
COMMUNITY

## 2021-05-17 ENCOUNTER — OFFICE VISIT (OUTPATIENT)
Dept: CARDIOLOGY | Facility: CLINIC | Age: 61
End: 2021-05-17

## 2021-05-17 VITALS
BODY MASS INDEX: 28.63 KG/M2 | HEART RATE: 60 BPM | OXYGEN SATURATION: 98 % | HEIGHT: 73 IN | WEIGHT: 216 LBS | SYSTOLIC BLOOD PRESSURE: 126 MMHG | DIASTOLIC BLOOD PRESSURE: 82 MMHG

## 2021-05-17 DIAGNOSIS — I10 ESSENTIAL HYPERTENSION: ICD-10-CM

## 2021-05-17 DIAGNOSIS — I50.22 CHRONIC SYSTOLIC CONGESTIVE HEART FAILURE (HCC): Primary | ICD-10-CM

## 2021-05-17 DIAGNOSIS — E78.2 MIXED HYPERLIPIDEMIA: ICD-10-CM

## 2021-05-17 PROCEDURE — 99214 OFFICE O/P EST MOD 30 MIN: CPT | Performed by: INTERNAL MEDICINE

## 2021-05-17 RX ORDER — HYDROCODONE BITARTRATE AND ACETAMINOPHEN 5; 325 MG/1; MG/1
1 TABLET ORAL AS NEEDED
COMMUNITY

## 2021-05-17 NOTE — PROGRESS NOTES
[facility] at [facility]  Office Progress Note  David Fajardo  1960  [phone]      Visit Date: 5/17/2021      PCP: Viki Mon MD  [address] [place] [address]    IDENTIFICATION: A 61 y.o. male, , retired, from [place], [place], daughter is [facility] pt Angela Burgos     Chief Complaint   Patient presents with   • Cardiomyopathy       PROBLEM LIST:   1. Nonischemic cardiomyopathy:  a. Abnormal Holter monitor with associated dizziness and nausea.   b. PVCs and nonsustained VT by Holter monitor.  c. Cardiac catheterization, 10/22/2013: Normal coronaries but EF approximately 15% to 20%.  d. Echocardiographic ejection fraction, January 2014, 45% to 50%.  e. Echocardiogram, 05/04/2015, EF of 45%.  f. Echo 1/19/17: EF 48%, mild MR/IA.  g. 10/12/2019 echo: EF 36% mod MR  2. Family history of premature cardiomyopathy.  3. History of bleeding ulcers with resolution.  4. Tobacco abuse, quitting 32 years ago.  5. Ehrlichiosis 2019(post [employer] cruise) shock w ARF, resolved post ICU SJE  6. Surgical history:  a. Right tear duct opening in 1998.        Allergies  No Known Allergies    Current Medications    Current Outpatient Medications:   •  atorvastatin (LIPITOR) 10 MG tablet, take 1 tablet by mouth once daily, Disp: 30 tablet, Rfl: 0  •  carvedilol (COREG) 6.25 MG tablet, Take 1 tablet by mouth 2 (Two) Times a Day., Disp: 60 tablet, Rfl: 11  •  cyclobenzaprine (FLEXERIL) 10 MG tablet, Take 10 mg by mouth 3 (Three) Times a Day As Needed., Disp: , Rfl: 0  •  famotidine (PEPCID) 40 MG tablet, Take 40 mg by mouth 2 (two) times a day., Disp: , Rfl:   •  HYDROcodone-acetaminophen (NORCO) 5-325 MG per tablet, Take 1 tablet by mouth As Needed., Disp: , Rfl:   •  lisinopril (PRINIVIL,ZESTRIL) 20 MG tablet, Take 1 tablet by mouth Daily., Disp: 30 tablet, Rfl: 11  •  ondansetron (ZOFRAN) 4 MG tablet, Take 4 mg by mouth Every 6 (Six) Hours As Needed., Disp: , Rfl: 0      History of  "Present Illness   Pt here for follow up.  Has had his Covid vaccines and states he is anxious about wanting to go on a cruise lines again!  States he is out-of-doors working in his yard regularly without any provocative symptoms  On occasion he states that his pulse rate will measure in the 40s but he is a symptom      OBJECTIVE:  Vitals:    05/17/21 1009   BP: 126/82   BP Location: Right arm   Patient Position: Sitting   Pulse: 60   SpO2: 98%   Weight: 98 kg (216 lb)   Height: 185.4 cm (73\")     Physical Exam  Vitals and nursing note reviewed.   Constitutional:       General: He is not in acute distress.     Appearance: He is well-developed.   Cardiovascular:      Rate and Rhythm: Normal rate and regular rhythm.      Pulses: Intact distal pulses.           Radial pulses are 2+ on the right side and 2+ on the left side.        Dorsalis pedis pulses are 2+ on the right side and 2+ on the left side.        Posterior tibial pulses are 2+ on the right side and 2+ on the left side.      Heart sounds: Normal heart sounds. No murmur heard.     Pulmonary:      Effort: Pulmonary effort is normal.      Breath sounds: Normal breath sounds. No wheezing or rales.   Abdominal:      General: Bowel sounds are normal.      Palpations: Abdomen is soft.      Tenderness: There is no abdominal tenderness. There is no guarding.   Musculoskeletal:         General: No tenderness.   Skin:     General: Skin is warm and dry.      Findings: No rash.   Neurological:      Mental Status: He is alert and oriented to person, place, and time.         Diagnostic Data:  Procedures  ASSESSMENT:   Diagnosis Plan   1. Chronic systolic congestive heart failure (CMS/HCC)     2. Essential hypertension     3. Mixed hyperlipidemia         PLAN:  Cardiomyopathy-familial New York Heart Association class II CHF at current follow-up echocardiogram this following visit    Coreg and lisinopril  dosed to avoid hypotension    Hl on statin      Ki Forbes MD, " FACC

## 2021-05-26 NOTE — TELEPHONE ENCOUNTER
Prior Authorization started in cover my meds    Please sign-on to Cover My Meds and complete the Prior authorization.    Message sent to the Prior authorization pool - Please don't close this message.        Prior authorization is required for medication - Prior authorization started online at Cover My Meds.    Medication requested to be refilled:   exenatide (BYETTA) 5 MCG/0.02ML pen-injector  Qty:  1.2 ml    ======================================================================    Generic therapeutic alternatives for Byetta 5 MCG Pen are available    metFORMIN HCl  metFORMIN HCl ER  glipiZIDE  glipiZIDE ER  Glimepiride  glipiZIDE-metFORMIN HCl  Pioglitazone HCl         Pt called in and he just got discharged from [facility]. Have records to be scanned in. Pt will have lab work in 2 weeks to check on his kidney function and to check his bp for one week. Pt is to call back to let us know how his blood pressure has been doing. Once his blood pressure and kidney function allow he can be restarted on his coreg and lisinopril.

## 2021-12-13 ENCOUNTER — OFFICE VISIT (OUTPATIENT)
Dept: CARDIOLOGY | Facility: CLINIC | Age: 61
End: 2021-12-13

## 2021-12-13 ENCOUNTER — HOSPITAL ENCOUNTER (OUTPATIENT)
Dept: CARDIOLOGY | Facility: HOSPITAL | Age: 61
Discharge: HOME | End: 2021-12-13
Admitting: INTERNAL MEDICINE

## 2021-12-13 VITALS
OXYGEN SATURATION: 96 % | WEIGHT: 220 LBS | DIASTOLIC BLOOD PRESSURE: 68 MMHG | BODY MASS INDEX: 29.16 KG/M2 | HEART RATE: 60 BPM | HEIGHT: 73 IN | SYSTOLIC BLOOD PRESSURE: 138 MMHG

## 2021-12-13 DIAGNOSIS — I50.22 CHRONIC SYSTOLIC CONGESTIVE HEART FAILURE (HCC): Primary | ICD-10-CM

## 2021-12-13 DIAGNOSIS — I10 PRIMARY HYPERTENSION: ICD-10-CM

## 2021-12-13 DIAGNOSIS — I50.22 CHRONIC SYSTOLIC CONGESTIVE HEART FAILURE (HCC): ICD-10-CM

## 2021-12-13 PROBLEM — I25.10 ATHEROSCLEROSIS OF CORONARY ARTERY: Status: ACTIVE | Noted: 2021-12-13

## 2021-12-13 PROBLEM — E78.5 DYSLIPIDEMIA: Status: ACTIVE | Noted: 2017-10-11

## 2021-12-13 PROBLEM — F41.9 ANXIETY: Status: ACTIVE | Noted: 2017-10-11

## 2021-12-13 PROCEDURE — 99213 OFFICE O/P EST LOW 20 MIN: CPT | Performed by: INTERNAL MEDICINE

## 2021-12-13 PROCEDURE — 93306 TTE W/DOPPLER COMPLETE: CPT

## 2021-12-13 PROCEDURE — 25010000002 SULFUR HEXAFLUORIDE MICROSPH 60.7-25 MG RECONSTITUTED SUSPENSION: Performed by: INTERNAL MEDICINE

## 2021-12-13 PROCEDURE — 93306 TTE W/DOPPLER COMPLETE: CPT | Performed by: INTERNAL MEDICINE

## 2021-12-13 RX ADMIN — SULFUR HEXAFLUORIDE 3 ML: KIT at 15:35

## 2021-12-13 NOTE — PROGRESS NOTES
[facility] at [facility]  Office Progress Note  David Fajardo  1960  [phone]      Visit Date: 12/13/2021      PCP: Viki Mon MD  [address] [place] [address]    IDENTIFICATION: A 61 y.o. male, , retired, from [place], [place], daughter is [facility] pt Angela Loretta     Chief Complaint   Patient presents with   • Cardiomyopathy       PROBLEM LIST:   1. Nonischemic cardiomyopathy:  a. Abnormal Holter monitor with associated dizziness and nausea.   b. PVCs and nonsustained VT by Holter monitor.  c. Cardiac catheterization, 10/22/2013: Normal coronaries but EF approximately 15% to 20%.  d. Echocardiographic ejection fraction, January 2014, 45% to 50%.  e. Echocardiogram, 05/04/2015, EF of 45%.  f. Echo 1/19/17: EF 48%, mild MR/MD.  g. 12/21 echo: EF 36% mod MR  2. Family history of premature cardiomyopathy.  3. History of bleeding ulcers with resolution.  4. Tobacco abuse, quitting 32 years ago.  5. Ehrlichiosis 2019(post [employer] cruise) shock w ARF, resolved post ICU SJE  6. Surgical history:  a. Right tear duct opening in 1998.        Allergies  No Known Allergies    Current Medications    Current Outpatient Medications:   •  atorvastatin (LIPITOR) 10 MG tablet, take 1 tablet by mouth once daily, Disp: 30 tablet, Rfl: 0  •  carvedilol (COREG) 6.25 MG tablet, Take 1 tablet by mouth 2 (Two) Times a Day., Disp: 60 tablet, Rfl: 11  •  famotidine (PEPCID) 40 MG tablet, Take 40 mg by mouth 2 (two) times a day., Disp: , Rfl:   •  HYDROcodone-acetaminophen (NORCO) 5-325 MG per tablet, Take 1 tablet by mouth As Needed., Disp: , Rfl:   •  lisinopril (PRINIVIL,ZESTRIL) 20 MG tablet, Take 1 tablet by mouth Daily., Disp: 30 tablet, Rfl: 11  •  ondansetron (ZOFRAN) 4 MG tablet, Take 4 mg by mouth Every 6 (Six) Hours As Needed., Disp: , Rfl: 0  No current facility-administered medications for this visit.      History of Present Illness   Pt here for follow up.  No issues  "recently returned from a trip to [place] [employer]  Walk 5 miles a day instead had no cardiac issues    OBJECTIVE:  Vitals:    12/13/21 1527   BP: 138/68   BP Location: Right arm   Patient Position: Sitting   Pulse: 60   SpO2: 96%   Weight: 99.8 kg (220 lb)   Height: 185.4 cm (73\")     Physical Exam  Vitals and nursing note reviewed.   Constitutional:       General: He is not in acute distress.     Appearance: He is well-developed.   Cardiovascular:      Rate and Rhythm: Normal rate and regular rhythm.      Pulses: Intact distal pulses.           Radial pulses are 2+ on the right side and 2+ on the left side.        Dorsalis pedis pulses are 2+ on the right side and 2+ on the left side.        Posterior tibial pulses are 2+ on the right side and 2+ on the left side.      Heart sounds: Normal heart sounds. No murmur heard.      Pulmonary:      Effort: Pulmonary effort is normal.      Breath sounds: Normal breath sounds. No wheezing or rales.   Abdominal:      General: Bowel sounds are normal.      Palpations: Abdomen is soft.      Tenderness: There is no abdominal tenderness. There is no guarding.   Musculoskeletal:         General: No tenderness.   Skin:     General: Skin is warm and dry.      Findings: No rash.   Neurological:      Mental Status: He is alert and oriented to person, place, and time.         Diagnostic Data:  Procedures  ASSESSMENT:   Diagnosis Plan   1. Chronic systolic congestive heart failure (HCC)     2. Primary hypertension         PLAN:  Cardiomyopathy-familial New York Heart Association class II CHF at current   Plan follow-up echocardiogram and/or MUGA scan in the next 1 year      Coreg and lisinopril  dosed to avoid hypotension    Hl on statin      Ki Forbes MD, FACC  "

## 2021-12-14 LAB
BH CV ECHO MEAS - BSA(HAYCOCK): 2.3 M^2
BH CV ECHO MEAS - BSA: 2.2 M^2
BH CV ECHO MEAS - BZI_BMI: 28.5 KILOGRAMS/M^2
BH CV ECHO MEAS - BZI_METRIC_HEIGHT: 185.4 CM
BH CV ECHO MEAS - BZI_METRIC_WEIGHT: 98 KG
BH CV ECHO MEAS - EF(MOD-BP): 35 %
BH CV XLRA - RV BASE: 4 CM
BH CV XLRA - RV LENGTH: 6.5 CM
BH CV XLRA - RV MID: 3.8 CM
LV EF 2D ECHO EST: 36 %

## 2022-12-19 ENCOUNTER — OFFICE VISIT (OUTPATIENT)
Dept: CARDIOLOGY | Facility: CLINIC | Age: 62
End: 2022-12-19

## 2022-12-19 VITALS
WEIGHT: 225.4 LBS | OXYGEN SATURATION: 98 % | HEART RATE: 59 BPM | SYSTOLIC BLOOD PRESSURE: 138 MMHG | BODY MASS INDEX: 29.87 KG/M2 | DIASTOLIC BLOOD PRESSURE: 82 MMHG | HEIGHT: 73 IN

## 2022-12-19 DIAGNOSIS — I42.0 DILATED CARDIOMYOPATHY: Primary | ICD-10-CM

## 2022-12-19 DIAGNOSIS — I49.3 PVC (PREMATURE VENTRICULAR CONTRACTION): ICD-10-CM

## 2022-12-19 DIAGNOSIS — R00.2 PALPITATIONS: ICD-10-CM

## 2022-12-19 PROCEDURE — 99214 OFFICE O/P EST MOD 30 MIN: CPT | Performed by: INTERNAL MEDICINE

## 2022-12-19 PROCEDURE — 93000 ELECTROCARDIOGRAM COMPLETE: CPT | Performed by: INTERNAL MEDICINE

## 2022-12-19 NOTE — PROGRESS NOTES
[facility] at [facility]  Office Progress Note  David Fajardo  1960  [phone]      Visit Date: 12/19/2022      PCP: Viki Mon MD  [address] [place] [address]    IDENTIFICATION: A 62 y.o. male, , retired, from [place], [place], daughter is [facility] pt Angela Burgos     Chief Complaint   Patient presents with   • Chronic systolic congestive heart failure (HCC)       PROBLEM LIST:   1. Nonischemic cardiomyopathy:  a. Abnormal Holter monitor with associated dizziness and nausea.   b. PVCs and nonsustained VT by Holter monitor.  c. Cardiac catheterization, 10/22/2013: Normal coronaries but EF approximately 15% to 20%.  d. Echocardiographic ejection fraction, January 2014, 45% to 50%.  e. Echocardiogram, 05/04/2015, EF of 45%.  f. Echo 1/19/17: EF 48%, mild MR/NE.  g. 12/21 echo: EF 36% mod MR  2. Family history of premature cardiomyopathy.  3. History of bleeding ulcers with resolution.  4. Tobacco abuse, quitting 32 years ago.  5. Ehrlichiosis 2019(post [employer] cruise) shock w ARF, resolved post ICU SJE  6. Surgical history:  a. Right tear duct opening in 1998.        Allergies  No Known Allergies    Current Medications    Current Outpatient Medications:   •  carvedilol (COREG) 6.25 MG tablet, Take 1 tablet by mouth 2 (Two) Times a Day., Disp: 60 tablet, Rfl: 11  •  famotidine (PEPCID) 40 MG tablet, Take 40 mg by mouth 2 (two) times a day., Disp: , Rfl:   •  HYDROcodone-acetaminophen (NORCO) 5-325 MG per tablet, Take 1 tablet by mouth As Needed., Disp: , Rfl:   •  lisinopril (PRINIVIL,ZESTRIL) 20 MG tablet, Take 1 tablet by mouth Daily., Disp: 30 tablet, Rfl: 11  •  ondansetron (ZOFRAN) 4 MG tablet, Take 4 mg by mouth Every 6 (Six) Hours As Needed., Disp: , Rfl: 0  •  atorvastatin (LIPITOR) 10 MG tablet, take 1 tablet by mouth once daily, Disp: 30 tablet, Rfl: 0      History of Present Illness   Continues to travel recently to [facility].  He does camping  "activities out-of-doors without provocative chest symptoms.    OBJECTIVE:  Vitals:    12/19/22 1410   BP: 138/82   BP Location: Right arm   Patient Position: Sitting   Pulse: 59   SpO2: 98%   Weight: 102 kg (225 lb 6.4 oz)   Height: 185.4 cm (73\")     Physical Exam  Vitals and nursing note reviewed.   Constitutional:       General: He is not in acute distress.     Appearance: He is well-developed.   Cardiovascular:      Rate and Rhythm: Normal rate and regular rhythm.      Pulses: Intact distal pulses.           Radial pulses are 2+ on the right side and 2+ on the left side.        Dorsalis pedis pulses are 2+ on the right side and 2+ on the left side.        Posterior tibial pulses are 2+ on the right side and 2+ on the left side.      Heart sounds: Normal heart sounds. No murmur heard.  Pulmonary:      Effort: Pulmonary effort is normal.      Breath sounds: Normal breath sounds. No wheezing or rales.   Abdominal:      General: Bowel sounds are normal.      Palpations: Abdomen is soft.      Tenderness: There is no abdominal tenderness. There is no guarding.   Musculoskeletal:         General: No tenderness.   Skin:     General: Skin is warm and dry.      Findings: No rash.   Neurological:      Mental Status: He is alert and oriented to person, place, and time.         Diagnostic Data:    ECG 12 Lead    Date/Time: 12/19/2022 2:31 PM  Performed by: Ki Forbes MD  Authorized by: Ki Forbes MD   Comparison: compared with previous ECG from 8/17/2020  Rhythm: sinus rhythm  Ectopy: infrequent PVCs  BPM: 59    Clinical impression: abnormal EKG          ASSESSMENT:   Diagnosis Plan   1. Dilated cardiomyopathy (HCC)        2. Palpitations        3. PVC (premature ventricular contraction)            PLAN:  Cardiomyopathy-familial New York Heart Association class II CHF at current   Plan follow-up echocardiogram and/or MUGA scan in the next 1 year      Coreg and lisinopril  dosed to avoid hypotension    Hl off " statin due to myalgias.  If needs rechallenge would recommend hydrophilic statin such as pravastatin or rosuvastatin      Ki Forbes MD, FACC

## 2024-03-05 DIAGNOSIS — I42.0 DILATED CARDIOMYOPATHY: Primary | ICD-10-CM

## 2024-03-05 DIAGNOSIS — I49.3 PVC (PREMATURE VENTRICULAR CONTRACTION): ICD-10-CM

## 2024-03-05 NOTE — PROGRESS NOTES
Bacitracin applied to thumb on right hand. Thum wrapped with sterile gauze and Coban.    Echo order for f/u appt.

## 2024-03-06 ENCOUNTER — HOSPITAL ENCOUNTER (OUTPATIENT)
Dept: CARDIOLOGY | Facility: HOSPITAL | Age: 64
Discharge: HOME | End: 2024-03-06
Admitting: INTERNAL MEDICINE
Payer: MEDICARE

## 2024-03-06 ENCOUNTER — OFFICE VISIT (OUTPATIENT)
Dept: CARDIOLOGY | Facility: CLINIC | Age: 64
End: 2024-03-06
Payer: MEDICARE

## 2024-03-06 VITALS — WEIGHT: 225 LBS | HEIGHT: 73 IN | BODY MASS INDEX: 29.82 KG/M2

## 2024-03-06 VITALS
HEART RATE: 60 BPM | WEIGHT: 220 LBS | OXYGEN SATURATION: 98 % | BODY MASS INDEX: 29.16 KG/M2 | SYSTOLIC BLOOD PRESSURE: 120 MMHG | DIASTOLIC BLOOD PRESSURE: 74 MMHG | HEIGHT: 73 IN

## 2024-03-06 DIAGNOSIS — I42.0 DILATED CARDIOMYOPATHY: ICD-10-CM

## 2024-03-06 DIAGNOSIS — R00.2 PALPITATIONS: ICD-10-CM

## 2024-03-06 DIAGNOSIS — I49.3 PVC (PREMATURE VENTRICULAR CONTRACTION): ICD-10-CM

## 2024-03-06 DIAGNOSIS — I42.0 DILATED CARDIOMYOPATHY: Primary | ICD-10-CM

## 2024-03-06 LAB
BH CV ECHO MEAS - AO MAX PG: 6.2 MMHG
BH CV ECHO MEAS - AO MEAN PG: 4 MMHG
BH CV ECHO MEAS - AO ROOT DIAM: 3.4 CM
BH CV ECHO MEAS - AO V2 MAX: 124 CM/SEC
BH CV ECHO MEAS - AO V2 VTI: 24.7 CM
BH CV ECHO MEAS - AVA(I,D): 2.01 CM2
BH CV ECHO MEAS - EDV(CUBED): 247.7 ML
BH CV ECHO MEAS - EDV(MOD-SP2): 205 ML
BH CV ECHO MEAS - EDV(MOD-SP4): 328 ML
BH CV ECHO MEAS - EF(MOD-BP): 44 %
BH CV ECHO MEAS - EF(MOD-SP2): 56.1 %
BH CV ECHO MEAS - EF(MOD-SP4): 43.9 %
BH CV ECHO MEAS - ESV(CUBED): 105.8 ML
BH CV ECHO MEAS - ESV(MOD-SP2): 90 ML
BH CV ECHO MEAS - ESV(MOD-SP4): 184 ML
BH CV ECHO MEAS - FS: 24.7 %
BH CV ECHO MEAS - IVS/LVPW: 0.98 CM
BH CV ECHO MEAS - IVSD: 0.99 CM
BH CV ECHO MEAS - LA DIMENSION: 4.3 CM
BH CV ECHO MEAS - LAT PEAK E' VEL: 6.1 CM/SEC
BH CV ECHO MEAS - LV MASS(C)D: 267 GRAMS
BH CV ECHO MEAS - LV MAX PG: 1.46 MMHG
BH CV ECHO MEAS - LV MEAN PG: 1 MMHG
BH CV ECHO MEAS - LV V1 MAX: 60.5 CM/SEC
BH CV ECHO MEAS - LV V1 VTI: 14.3 CM
BH CV ECHO MEAS - LVIDD: 6.3 CM
BH CV ECHO MEAS - LVIDS: 4.7 CM
BH CV ECHO MEAS - LVOT AREA: 3.5 CM2
BH CV ECHO MEAS - LVOT DIAM: 2.1 CM
BH CV ECHO MEAS - LVPWD: 1.01 CM
BH CV ECHO MEAS - MED PEAK E' VEL: 5.95 CM/SEC
BH CV ECHO MEAS - MR MAX PG: 104.4 MMHG
BH CV ECHO MEAS - MR MAX VEL: 511 CM/SEC
BH CV ECHO MEAS - MR MEAN PG: 64 MMHG
BH CV ECHO MEAS - MR MEAN VEL: 379 CM/SEC
BH CV ECHO MEAS - MR VTI: 187 CM
BH CV ECHO MEAS - MV A MAX VEL: 75.6 CM/SEC
BH CV ECHO MEAS - MV DEC SLOPE: 176 CM/SEC2
BH CV ECHO MEAS - MV DEC TIME: 0.3 SEC
BH CV ECHO MEAS - MV E MAX VEL: 50.5 CM/SEC
BH CV ECHO MEAS - MV E/A: 0.67
BH CV ECHO MEAS - MV MAX PG: 2.9 MMHG
BH CV ECHO MEAS - MV MEAN PG: 1 MMHG
BH CV ECHO MEAS - MV P1/2T: 102.7 MSEC
BH CV ECHO MEAS - MV V2 VTI: 25.6 CM
BH CV ECHO MEAS - MVA(P1/2T): 2.14 CM2
BH CV ECHO MEAS - MVA(VTI): 1.93 CM2
BH CV ECHO MEAS - PA ACC SLOPE: 581 CM/SEC2
BH CV ECHO MEAS - PA ACC TIME: 0.1 SEC
BH CV ECHO MEAS - PA V2 MAX: 114 CM/SEC
BH CV ECHO MEAS - PI END-D VEL: 119 CM/SEC
BH CV ECHO MEAS - RAP SYSTOLE: 8 MMHG
BH CV ECHO MEAS - RVSP: 20 MMHG
BH CV ECHO MEAS - SV(LVOT): 49.5 ML
BH CV ECHO MEAS - SV(MOD-SP2): 115 ML
BH CV ECHO MEAS - SV(MOD-SP4): 144 ML
BH CV ECHO MEAS - TAPSE (>1.6): 1.9 CM
BH CV ECHO MEAS - TR MAX PG: 12 MMHG
BH CV ECHO MEAS - TR MAX VEL: 173 CM/SEC
BH CV ECHO MEASUREMENTS AVERAGE E/E' RATIO: 8.38
BH CV XLRA - RV BASE: 4.5 CM
BH CV XLRA - RV LENGTH: 6.6 CM
BH CV XLRA - RV MID: 3.4 CM
BH CV XLRA - TDI S': 16 CM/SEC
LEFT ATRIUM VOLUME INDEX: 38.5 ML/M2

## 2024-03-06 PROCEDURE — 99214 OFFICE O/P EST MOD 30 MIN: CPT | Performed by: INTERNAL MEDICINE

## 2024-03-06 PROCEDURE — 93306 TTE W/DOPPLER COMPLETE: CPT | Performed by: INTERNAL MEDICINE

## 2024-03-06 PROCEDURE — 93306 TTE W/DOPPLER COMPLETE: CPT

## 2024-03-06 NOTE — PROGRESS NOTES
[facility] at [facility]  Office Progress Note  David Fajardo  1960  [phone]      Visit Date: 3/6/2024      PCP: Viki Mon MD  [address] [place] [address]    IDENTIFICATION: A 64 y.o. male, , retired, from [place], [place], daughter is [facility] pt Angela Loretta   Raises beagles and rabbit hunts    Chief Complaint   Patient presents with    Cardiomyopathy    PVC'S       PROBLEM LIST:   Nonischemic cardiomyopathy:  Abnormal Holter monitor with associated dizziness and nausea.   PVCs and nonsustained VT by Holter monitor.  10/22/2013 Cardiac catheterization: Normal coronaries but EF approximately 15% to 20%.  1/2014 Echocardiographic ejection fraction 45% to 50%.  5/2015 Echocardiogram EF of 45%.  1/19/17 Echo: EF 48%, mild MR/SD.  12/21 echo: EF 36% mod MR  3/24 EF 43% mild LV dilation  Family history of nonischemic cardiomyopathy.  History of bleeding ulcers with resolution.  Tobacco abuse, quitting 32 years ago.  Ehrlichiosis 2019(post [employer] cruise) shock w ARF, resolved post ICU SJE  Surgical history:  Right tear duct opening in 1998.        Allergies  No Known Allergies    Current Medications    Current Outpatient Medications:     atorvastatin (LIPITOR) 10 MG tablet, take 1 tablet by mouth once daily, Disp: 30 tablet, Rfl: 0    carvedilol (COREG) 6.25 MG tablet, Take 1 tablet by mouth 2 (Two) Times a Day., Disp: 60 tablet, Rfl: 11    famotidine (PEPCID) 40 MG tablet, Take 1 tablet by mouth 2 (two) times a day., Disp: , Rfl:     HYDROcodone-acetaminophen (NORCO) 5-325 MG per tablet, Take 1 tablet by mouth As Needed., Disp: , Rfl:     lisinopril (PRINIVIL,ZESTRIL) 20 MG tablet, Take 1 tablet by mouth Daily., Disp: 30 tablet, Rfl: 11    ondansetron (ZOFRAN) 4 MG tablet, Take 1 tablet by mouth Every 6 (Six) Hours As Needed., Disp: , Rfl: 0      History of Present Illness   Feels well no shortness of breath or chest discomfort.  Interestingly his brother has  "now been diagnosed with nonischemic cardiomyopathy    OBJECTIVE:  Vitals:    03/06/24 1441   BP: 120/74   BP Location: Right arm   Patient Position: Sitting   Cuff Size: Adult   Pulse: 60   SpO2: 98%   Weight: 99.8 kg (220 lb)   Height: 185.4 cm (73\")       Physical Exam  Vitals and nursing note reviewed.   Constitutional:       General: He is not in acute distress.     Appearance: He is well-developed.   Cardiovascular:      Rate and Rhythm: Normal rate and regular rhythm.      Pulses: Intact distal pulses.           Radial pulses are 2+ on the right side and 2+ on the left side.        Dorsalis pedis pulses are 2+ on the right side and 2+ on the left side.        Posterior tibial pulses are 2+ on the right side and 2+ on the left side.      Heart sounds: Normal heart sounds. No murmur heard.  Pulmonary:      Effort: Pulmonary effort is normal.      Breath sounds: Normal breath sounds. No wheezing or rales.   Abdominal:      General: Bowel sounds are normal.      Palpations: Abdomen is soft.      Tenderness: There is no abdominal tenderness. There is no guarding.   Musculoskeletal:         General: No tenderness.   Skin:     General: Skin is warm and dry.      Findings: No rash.   Neurological:      Mental Status: He is alert and oriented to person, place, and time.         Diagnostic Data:  Procedures  ASSESSMENT:   Diagnosis Plan   1. Dilated cardiomyopathy        2. PVC (premature ventricular contraction)        3. Palpitations              PLAN:  Cardiomyopathy-familial New York Heart Association class II CHF at current    echocardiogram       Coreg and lisinopril  dosed to avoid hypotension    Hl off statin due to myalgias.  If needs rechallenge would recommend hydrophilic statin such as pravastatin or rosuvastatin      Ki Forbes MD, FACC  "

## 2024-11-26 ENCOUNTER — TELEPHONE (OUTPATIENT)
Dept: CARDIOLOGY | Facility: CLINIC | Age: 64
End: 2024-11-26
Payer: MEDICARE

## 2024-11-26 NOTE — TELEPHONE ENCOUNTER
Spoke to spouse ,states pt feels great , works daily , but concerned heart rate in 40's  in the evenings , does rebound . Encouraged spouse this is acceptable al long as pt remains asymptomatic, no soa , fatique etc. Verbalized understanding . Encouraged to call back with any further questions .

## 2024-11-26 NOTE — TELEPHONE ENCOUNTER
Caller: KRISTIE OLIVIA     Relationship: SELF    Best call back number: [phone]    What is your medical concern? FOR ABOUT A WEEK NOW THE PT HEART RATE HAS BEEN DROPPING DOWN TO THE LOW 40'S. IT GOES BACK UP AFTER A LITTLE WHILE AND THE PT HAS NO SYMPTOMS. THIS COMES AND GOES. HIS WIFE IS CONCERNED AND WOULD LIKE TO KNOW IF THIS IS OK. PLEASE REACH OUT TO ADVISE.     How long has this issue been going on? A WEEK NOW

## 2025-03-11 ENCOUNTER — TRANSCRIBE ORDERS (OUTPATIENT)
Dept: GENERAL RADIOLOGY | Facility: HOSPITAL | Age: 65
End: 2025-03-11
Payer: MEDICARE

## 2025-03-11 ENCOUNTER — HOSPITAL ENCOUNTER (OUTPATIENT)
Dept: GENERAL RADIOLOGY | Facility: HOSPITAL | Age: 65
Discharge: HOME | End: 2025-03-11
Admitting: FAMILY MEDICINE
Payer: MEDICARE

## 2025-03-11 DIAGNOSIS — J40 BRONCHITIS, NOT SPECIFIED AS ACUTE OR CHRONIC: Primary | ICD-10-CM

## 2025-03-11 DIAGNOSIS — J40 BRONCHITIS, NOT SPECIFIED AS ACUTE OR CHRONIC: ICD-10-CM

## 2025-03-11 PROCEDURE — 71046 X-RAY EXAM CHEST 2 VIEWS: CPT

## 2025-06-13 ENCOUNTER — TELEPHONE (OUTPATIENT)
Dept: CARDIOLOGY | Facility: CLINIC | Age: 65
End: 2025-06-13
Payer: MEDICARE

## 2025-06-16 ENCOUNTER — OFFICE VISIT (OUTPATIENT)
Dept: CARDIOLOGY | Facility: CLINIC | Age: 65
End: 2025-06-16
Payer: MEDICARE

## 2025-06-16 VITALS
WEIGHT: 220 LBS | OXYGEN SATURATION: 97 % | SYSTOLIC BLOOD PRESSURE: 126 MMHG | DIASTOLIC BLOOD PRESSURE: 70 MMHG | BODY MASS INDEX: 29.16 KG/M2 | HEART RATE: 61 BPM | HEIGHT: 73 IN

## 2025-06-16 DIAGNOSIS — I42.0 DILATED CARDIOMYOPATHY: Primary | ICD-10-CM

## 2025-06-16 DIAGNOSIS — R00.2 PALPITATIONS: ICD-10-CM

## 2025-06-16 DIAGNOSIS — I49.3 PVC (PREMATURE VENTRICULAR CONTRACTION): ICD-10-CM

## 2025-06-16 PROCEDURE — 99214 OFFICE O/P EST MOD 30 MIN: CPT | Performed by: INTERNAL MEDICINE

## 2025-06-16 NOTE — PROGRESS NOTES
[facility]  Office Progress Note  David Fajardo  1960  [phone]      Visit Date: 6/16/2025      PCP: Viki Mon MD  [address] [place] [address]    IDENTIFICATION: A 65 y.o. male, , retired, from [place], [place], daughter is [facility] pt Angela Loretta   Raises beagles and rabbit hunts    Chief Complaint   Patient presents with    Cardiomyopathy       PROBLEM LIST:   Nonischemic cardiomyopathy:  Abnormal Holter monitor with associated dizziness and nausea.   PVCs and nonsustained VT by Holter monitor.  10/22/2013 Cardiac catheterization: Normal coronaries but EF approximately 15% to 20%.  1/2014 Echocardiographic ejection fraction 45% to 50%.  5/2015 Echocardiogram EF of 45%.  1/19/17 Echo: EF 48%, mild MR/MT.  12/21 echo: EF 36% mod MR  3/24 EF 43% mild LV dilation  Family history of nonischemic cardiomyopathy.  History of bleeding ulcers with resolution.  Tobacco abuse, quitting 32 years ago.  Ehrlichiosis 2019(post [place] cruise) shock w ARF, resolved post ICU SJE  Surgical history:  Right tear duct opening in 1998.        Allergies  No Known Allergies    Current Medications    Current Outpatient Medications:     atorvastatin (LIPITOR) 10 MG tablet, take 1 tablet by mouth once daily, Disp: 30 tablet, Rfl: 0    carvedilol (COREG) 6.25 MG tablet, Take 1 tablet by mouth 2 (Two) Times a Day., Disp: 60 tablet, Rfl: 11    famotidine (PEPCID) 40 MG tablet, Take 1 tablet by mouth 2 (two) times a day., Disp: , Rfl:     HYDROcodone-acetaminophen (NORCO) 5-325 MG per tablet, Take 1 tablet by mouth As Needed., Disp: , Rfl:     lisinopril (PRINIVIL,ZESTRIL) 20 MG tablet, Take 1 tablet by mouth Daily., Disp: 30 tablet, Rfl: 11      History of Present Illness   No new cardiac issues compliant with medication has been taking his once daily medications at nighttime and has no daytime hypotension.    OBJECTIVE:  Vitals:    06/16/25 1140   BP: 126/70   BP  "Location: Right arm   Patient Position: Sitting   Cuff Size: Infant   Pulse: 61   SpO2: 97%   Weight: 99.8 kg (220 lb)   Height: 185.4 cm (73\")         Physical Exam  Vitals and nursing note reviewed.   Constitutional:       General: He is not in acute distress.     Appearance: He is well-developed.   Cardiovascular:      Rate and Rhythm: Normal rate and regular rhythm.      Pulses: Intact distal pulses.           Radial pulses are 2+ on the right side and 2+ on the left side.        Dorsalis pedis pulses are 2+ on the right side and 2+ on the left side.        Posterior tibial pulses are 2+ on the right side and 2+ on the left side.      Heart sounds: Normal heart sounds. No murmur heard.  Pulmonary:      Effort: Pulmonary effort is normal.      Breath sounds: Normal breath sounds. No wheezing or rales.   Abdominal:      General: Bowel sounds are normal.      Palpations: Abdomen is soft.      Tenderness: There is no abdominal tenderness. There is no guarding.   Musculoskeletal:         General: No tenderness.   Skin:     General: Skin is warm and dry.      Findings: No rash.   Neurological:      Mental Status: He is alert and oriented to person, place, and time.         Diagnostic Data:  Procedures  ASSESSMENT:   Diagnosis Plan   1. Dilated cardiomyopathy        2. PVC (premature ventricular contraction)        3. Palpitations                PLAN:  Cardiomyopathy-familial New York Heart Association class II CHF at current    echocardiogram       Coreg and lisinopril  dosed to avoid hypotension    Mixed dyslipidemia controlled on statin      Ki Forbes MD, FACC  "

## 2025-07-24 ENCOUNTER — APPOINTMENT (OUTPATIENT)
Dept: CT IMAGING | Facility: HOSPITAL | Age: 65
End: 2025-07-24
Payer: MEDICARE

## 2025-07-24 ENCOUNTER — APPOINTMENT (OUTPATIENT)
Dept: GENERAL RADIOLOGY | Facility: HOSPITAL | Age: 65
End: 2025-07-24
Payer: MEDICARE

## 2025-07-24 ENCOUNTER — HOSPITAL ENCOUNTER (EMERGENCY)
Facility: HOSPITAL | Age: 65
Discharge: HOME | End: 2025-07-24
Attending: EMERGENCY MEDICINE | Admitting: EMERGENCY MEDICINE
Payer: MEDICARE

## 2025-07-24 VITALS
SYSTOLIC BLOOD PRESSURE: 122 MMHG | TEMPERATURE: 98 F | RESPIRATION RATE: 14 BRPM | HEIGHT: 73 IN | DIASTOLIC BLOOD PRESSURE: 82 MMHG | HEART RATE: 74 BPM | WEIGHT: 220 LBS | OXYGEN SATURATION: 96 % | BODY MASS INDEX: 29.16 KG/M2

## 2025-07-24 DIAGNOSIS — S46.911A STRAIN OF RIGHT SHOULDER, INITIAL ENCOUNTER: ICD-10-CM

## 2025-07-24 DIAGNOSIS — M54.12 CERVICAL RADICULOPATHY AT C5: Primary | ICD-10-CM

## 2025-07-24 PROCEDURE — 99284 EMERGENCY DEPT VISIT MOD MDM: CPT | Performed by: EMERGENCY MEDICINE

## 2025-07-24 PROCEDURE — 73030 X-RAY EXAM OF SHOULDER: CPT

## 2025-07-24 PROCEDURE — 72125 CT NECK SPINE W/O DYE: CPT

## 2025-07-24 RX ORDER — CYCLOBENZAPRINE HCL 10 MG
10 TABLET ORAL 2 TIMES DAILY PRN
Qty: 30 TABLET | Refills: 0 | Status: SHIPPED | OUTPATIENT
Start: 2025-07-24

## 2025-07-24 RX ORDER — CYCLOBENZAPRINE HCL 10 MG
5 TABLET ORAL ONCE
Status: COMPLETED | OUTPATIENT
Start: 2025-07-24 | End: 2025-07-24

## 2025-07-24 RX ADMIN — CYCLOBENZAPRINE 5 MG: 10 TABLET, FILM COATED ORAL at 15:52

## 2025-07-24 NOTE — ED PROVIDER NOTES
Pt Name: David Fajardo  MRN: [id number]  Pt :   1960  Room Number:  24SF/24  Date of encounter:  2025  PCP: Viki Mon MD  ED Provider: SELVIN Jackson    Historian: Patient and Family    Subjective    History of Present Illness:    HPI:    Chief Complaint   Patient presents with    Shoulder Pain        History of Present Illness: David Fajardo is a 65 y.o. male who presents to the ER complaining of neck pain that started 3 days ago and does radiate to his right shoulder.  Pain is described as throbbing, radiates to right shoulder.  Patient rates pain as a 6 on a ten scale.    Triage Vitals:    ED Triage Vitals [25 1428]   Temp Heart Rate Resp BP SpO2   97.8 °F (36.6 °C) 68 17 118/95 96 %      Temp src Heart Rate Source Patient Position BP Location FiO2 (%)   Oral Monitor Sitting Left arm --       Nurses Notes reviewed and agree, including vitals, allergies, social history and prior medical history.     Patient has no known allergies.    Past Medical History:   Diagnosis Date    Anxiety     History of echocardiogram 2021    Nonischemic cardiomyopathy     Peptic ulceration     History of bleeding ulcers with resolution.    Tobacco abuse     Tobacco abuse, quitting 32 years ago.       Past Surgical History:   Procedure Laterality Date    TEAR DUCT SURGERY Right     Right tear duct opening in .       Social History     Socioeconomic History    Marital status:    Tobacco Use    Smoking status: Former     Current packs/day: 0.00     Types: Cigarettes     Quit date: 1980     Years since quittin.5     Passive exposure: Past    Smokeless tobacco: Never   Vaping Use    Vaping status: Never Used   Substance and Sexual Activity    Alcohol use: Yes     Comment: OCCAS    Drug use: No    Sexual activity: Defer       Family History   Problem Relation Age of Onset    Cardiomyopathy Other     Colon cancer Mother     Heart attack Father     Cardiomyopathy Father         REVIEW OF SYSTEMS:     All systems reviewed and not pertinent unless noted.    Review of Systems   Musculoskeletal:  Positive for myalgias and neck pain.   All other systems reviewed and are negative.      Objective    Physical Exam  Vitals and nursing note reviewed.   Constitutional:       Appearance: Normal appearance.   HENT:      Head: Normocephalic and atraumatic.   Eyes:      Extraocular Movements: Extraocular movements intact.      Pupils: Pupils are equal, round, and reactive to light.   Neck:     Cardiovascular:      Rate and Rhythm: Normal rate and regular rhythm.      Pulses: Normal pulses.      Heart sounds: Normal heart sounds.   Pulmonary:      Effort: Pulmonary effort is normal.      Breath sounds: Normal breath sounds.   Abdominal:      General: Bowel sounds are normal.      Palpations: Abdomen is soft.   Musculoskeletal:         General: Normal range of motion.      Right shoulder: Tenderness present. No crepitus.      Cervical back: Normal range of motion and neck supple. No rigidity or tenderness. Muscular tenderness present.   Lymphadenopathy:      Cervical: No cervical adenopathy.   Skin:     General: Skin is warm and dry.      Capillary Refill: Capillary refill takes less than 2 seconds.   Neurological:      Mental Status: He is alert.      GCS: GCS eye subscore is 4. GCS verbal subscore is 5. GCS motor subscore is 6.      Sensory: Sensation is intact.      Motor: Motor function is intact.   Psychiatric:         Attention and Perception: Attention and perception normal.         Mood and Affect: Mood and affect normal.         Speech: Speech normal.                           Procedures    ED Course:    No orders to display            Orders placed during this visit:    Orders Placed This Encounter   Procedures    CT Cervical Spine Without Contrast    XR Shoulder 2+ View Right       LAB Results:    Lab Results (last 24 hours)       ** No results found for the last 24 hours. **              If labs were ordered, I have independently reviewed the results and considered them in the diagnosis and treatment plan for the patient    RADIOLOGY    CT Cervical Spine Without Contrast  Result Date: 7/24/2025  PROCEDURE: CT CERVICAL SPINE WO CONTRAST-  HISTORY: Neck pain radiates to right shoulder  COMPARISON: None.  PROCEDURE: Axial images were obtained from the skull base to the thoracic inlet by computed tomography. 3 D reconstruction images were performed. This study was performed with techniques to keep radiation doses as low as reasonably achievable, (ALARA). Individualized dose reduction techniques using automated exposure control or adjustment of mA and/or kV according to the patient size were employed.  FINDINGS: There is no acute fracture or subluxation. There is mild to moderate disc base narrowing at some levels. Minimal osteophyte formation noted. The facets are normally aligned. The soft tissues are unremarkable. Limited images of the lung apices are unremarkable. 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 C5-6: There is mild disc/osteophyte complex causing mild spinal stenosis and mild, bilateral neuroforaminal narrowing. C6-7: Mild disc/osteophyte complex causes mild spinal stenosis and mild, bilateral neuroforaminal narrowing.      Impression: No acute fracture.  Degenerative change.   CTDI: 16.09 mGy DLP:404.35 mGy.cm  This report was signed and finalized on 7/24/2025 4:35 PM by Roseline Esteban MD.      XR Shoulder 2+ View Right  Result Date: 7/24/2025   PROCEDURE: XR SHOULDER 2+ VW RIGHT-  HISTORY: Shoulder pain  COMPARISON: None.  FINDINGS:  A three view exam demonstrates no acute fracture or dislocation. The joint spaces demonstrate moderate narrowing and irregularity of the right acromioclavicular joint. Glenohumeral joint appears normal. No soft tissue abnormality is seen.       Impression: No acute bony abnormality.  Degenerative change right AC joint.      This report  was signed and finalized on 7/24/2025 4:07 PM by Roseline Esteban MD.         If I have ordered, I have independently reviewed the above noted radiographic studies.  Please see the radiologist dictation for the official interpretation    Medications given to patient in the ER    Medications   cyclobenzaprine (FLEXERIL) tablet 5 mg (5 mg Oral Given 7/24/25 6282)       AS OF 19:06 EDT VITALS:    BP - 122/82  HR - 74  TEMP - 98 °F (36.7 °C) (Oral)  O2 SATS - 96%         MEDICAL DECISION MAKING, PROGRESS, and CONSULTS    All labs, if obtained, have been independently reviewed by me.  All radiology studies, if obtained, have been reviewed by me and the radiologist dictating the report.  All EKG's, if obtained, have been independently viewed and interpreted by me      Discussion below represents my analysis of pertinent findings related to patient's condition, differential diagnosis, treatment plan and final disposition.      Differential diagnosis:    Neck strain, cervical radiculopathy, shoulder pain, shoulder contusion, other    Additional Sources:  None      Orders placed during this visit:  Orders Placed This Encounter   Procedures    CT Cervical Spine Without Contrast    XR Shoulder 2+ View Right         ED Course:    Consultants:  None    David Fajardo is a 65 y.o. male who presents to the ER complaining of neck pain that started 3 days ago and does radiate to his right shoulder.  Pain is described as throbbing, radiates to right shoulder.  Patient rates pain as a 6 on a ten scale.    Physical exam patient shows mild tenderness to the right neck muscle, no tenderness to palpation along cervical spine.  Patient has pain to mid clavicular shoulder muscle x-ray was taken of the right shoulder which showed no acute fracture some chronic arthritic changes to shoulder joint.  Patient cervical spine CT scan shows abnormality to C5-C6 disc space.  Patient given Flexeril here in the emergency room had improvement and pain  patient will be referred to orthopedics for evaluation of his cervical radiculopathy and right muscle strain.    AS OF 19:06 EDT VITALS:    BP - 122/82  HR - 74  TEMP - 98 °F (36.7 °C) (Oral)  O2 SATS - 96%    I reviewed the patient's prescription monitoring report if available prior to discharge    DIAGNOSIS  Final diagnoses:   Cervical radiculopathy at C5   Strain of right shoulder, initial encounter         DISPOSITION  ED Disposition       ED Disposition   Discharge    Condition   Stable    Comment   --                   Please note that portions of this document were completed with voice recognition software.        Wilfredo Nielson, APRN  07/24/25 7000